# Patient Record
Sex: MALE | Race: OTHER | HISPANIC OR LATINO | URBAN - METROPOLITAN AREA
[De-identification: names, ages, dates, MRNs, and addresses within clinical notes are randomized per-mention and may not be internally consistent; named-entity substitution may affect disease eponyms.]

---

## 2017-07-25 ENCOUNTER — EMERGENCY (EMERGENCY)
Facility: HOSPITAL | Age: 37
LOS: 0 days | Discharge: HOME | End: 2017-07-25

## 2017-07-25 DIAGNOSIS — L23.7 ALLERGIC CONTACT DERMATITIS DUE TO PLANTS, EXCEPT FOOD: ICD-10-CM

## 2017-07-25 DIAGNOSIS — L29.9 PRURITUS, UNSPECIFIED: ICD-10-CM

## 2017-07-25 DIAGNOSIS — R21 RASH AND OTHER NONSPECIFIC SKIN ERUPTION: ICD-10-CM

## 2017-08-25 ENCOUNTER — EMERGENCY (EMERGENCY)
Facility: HOSPITAL | Age: 37
LOS: 0 days | Discharge: HOME | End: 2017-08-25

## 2017-08-25 DIAGNOSIS — H60.91 UNSPECIFIED OTITIS EXTERNA, RIGHT EAR: ICD-10-CM

## 2017-08-25 DIAGNOSIS — H92.01 OTALGIA, RIGHT EAR: ICD-10-CM

## 2017-09-01 ENCOUNTER — APPOINTMENT (OUTPATIENT)
Dept: OTOLARYNGOLOGY | Facility: CLINIC | Age: 37
End: 2017-09-01
Payer: COMMERCIAL

## 2017-09-01 VITALS — WEIGHT: 180 LBS | BODY MASS INDEX: 28.93 KG/M2 | HEIGHT: 66 IN

## 2017-09-01 DIAGNOSIS — Z78.9 OTHER SPECIFIED HEALTH STATUS: ICD-10-CM

## 2017-09-01 PROBLEM — Z00.00 ENCOUNTER FOR PREVENTIVE HEALTH EXAMINATION: Status: ACTIVE | Noted: 2017-09-01

## 2017-09-01 PROCEDURE — 99204 OFFICE O/P NEW MOD 45 MIN: CPT

## 2017-09-16 ENCOUNTER — OUTPATIENT (OUTPATIENT)
Dept: OUTPATIENT SERVICES | Facility: HOSPITAL | Age: 37
LOS: 1 days | Discharge: HOME | End: 2017-09-16

## 2017-09-16 DIAGNOSIS — H66.41 SUPPURATIVE OTITIS MEDIA, UNSPECIFIED, RIGHT EAR: ICD-10-CM

## 2017-10-16 ENCOUNTER — APPOINTMENT (OUTPATIENT)
Dept: OTOLARYNGOLOGY | Facility: CLINIC | Age: 37
End: 2017-10-16
Payer: COMMERCIAL

## 2017-10-16 VITALS — HEIGHT: 66 IN | WEIGHT: 181 LBS | BODY MASS INDEX: 29.09 KG/M2

## 2017-10-16 PROCEDURE — 99213 OFFICE O/P EST LOW 20 MIN: CPT

## 2017-11-06 ENCOUNTER — OUTPATIENT (OUTPATIENT)
Dept: OUTPATIENT SERVICES | Facility: HOSPITAL | Age: 37
LOS: 1 days | Discharge: HOME | End: 2017-11-06

## 2017-11-06 DIAGNOSIS — Z01.818 ENCOUNTER FOR OTHER PREPROCEDURAL EXAMINATION: ICD-10-CM

## 2017-11-06 DIAGNOSIS — H66.41 SUPPURATIVE OTITIS MEDIA, UNSPECIFIED, RIGHT EAR: ICD-10-CM

## 2017-11-14 ENCOUNTER — OUTPATIENT (OUTPATIENT)
Dept: OUTPATIENT SERVICES | Facility: HOSPITAL | Age: 37
LOS: 1 days | Discharge: HOME | End: 2017-11-14

## 2017-11-14 ENCOUNTER — APPOINTMENT (OUTPATIENT)
Dept: OTOLARYNGOLOGY | Facility: AMBULATORY SURGERY CENTER | Age: 37
End: 2017-11-14
Payer: COMMERCIAL

## 2017-11-14 ENCOUNTER — OTHER (OUTPATIENT)
Age: 37
End: 2017-11-14

## 2017-11-14 DIAGNOSIS — H90.11 CONDUCTIVE HEARING LOSS, UNILATERAL, RIGHT EAR, WITH UNRESTRICTED HEARING ON THE CONTRALATERAL SIDE: ICD-10-CM

## 2017-11-14 PROCEDURE — 69636 REBUILD EARDRUM STRUCTURES: CPT

## 2017-11-21 DIAGNOSIS — H71.91 UNSPECIFIED CHOLESTEATOMA, RIGHT EAR: ICD-10-CM

## 2017-11-21 DIAGNOSIS — H90.2 CONDUCTIVE HEARING LOSS, UNSPECIFIED: ICD-10-CM

## 2017-11-22 ENCOUNTER — APPOINTMENT (OUTPATIENT)
Dept: OTOLARYNGOLOGY | Facility: CLINIC | Age: 37
End: 2017-11-22
Payer: COMMERCIAL

## 2017-11-22 VITALS — BODY MASS INDEX: 29.73 KG/M2 | WEIGHT: 185 LBS | HEIGHT: 66 IN

## 2017-11-22 DIAGNOSIS — H71.91 UNSPECIFIED CHOLESTEATOMA, RIGHT EAR: ICD-10-CM

## 2017-11-22 PROCEDURE — 99024 POSTOP FOLLOW-UP VISIT: CPT

## 2017-12-01 ENCOUNTER — APPOINTMENT (OUTPATIENT)
Dept: OTOLARYNGOLOGY | Facility: CLINIC | Age: 37
End: 2017-12-01
Payer: COMMERCIAL

## 2017-12-01 VITALS — BODY MASS INDEX: 29.73 KG/M2 | WEIGHT: 185 LBS | HEIGHT: 66 IN

## 2017-12-01 PROCEDURE — 99024 POSTOP FOLLOW-UP VISIT: CPT

## 2017-12-21 ENCOUNTER — APPOINTMENT (OUTPATIENT)
Dept: OTOLARYNGOLOGY | Facility: CLINIC | Age: 37
End: 2017-12-21
Payer: COMMERCIAL

## 2017-12-21 VITALS — WEIGHT: 185 LBS | HEIGHT: 66 IN | BODY MASS INDEX: 29.73 KG/M2

## 2017-12-21 PROCEDURE — 99024 POSTOP FOLLOW-UP VISIT: CPT

## 2018-01-29 ENCOUNTER — APPOINTMENT (OUTPATIENT)
Dept: OTOLARYNGOLOGY | Facility: CLINIC | Age: 38
End: 2018-01-29
Payer: COMMERCIAL

## 2018-01-29 VITALS — WEIGHT: 184 LBS | HEIGHT: 66 IN | BODY MASS INDEX: 29.57 KG/M2

## 2018-01-29 DIAGNOSIS — H62.40 SUPERFICIAL MYCOSIS, UNSPECIFIED: ICD-10-CM

## 2018-01-29 DIAGNOSIS — B36.9 SUPERFICIAL MYCOSIS, UNSPECIFIED: ICD-10-CM

## 2018-01-29 PROCEDURE — 99024 POSTOP FOLLOW-UP VISIT: CPT

## 2018-01-29 PROCEDURE — G0268 REMOVAL OF IMPACTED WAX MD: CPT

## 2018-02-15 ENCOUNTER — APPOINTMENT (OUTPATIENT)
Dept: OTOLARYNGOLOGY | Facility: CLINIC | Age: 38
End: 2018-02-15

## 2018-04-12 ENCOUNTER — APPOINTMENT (OUTPATIENT)
Dept: OTOLARYNGOLOGY | Facility: CLINIC | Age: 38
End: 2018-04-12
Payer: SUBSIDIZED

## 2018-04-12 DIAGNOSIS — H66.41 SUPPURATIVE OTITIS MEDIA, UNSPECIFIED, RIGHT EAR: ICD-10-CM

## 2018-04-12 PROCEDURE — 99213 OFFICE O/P EST LOW 20 MIN: CPT | Mod: 25

## 2018-04-12 PROCEDURE — 92567 TYMPANOMETRY: CPT

## 2018-04-12 PROCEDURE — 92557 COMPREHENSIVE HEARING TEST: CPT

## 2018-11-20 ENCOUNTER — APPOINTMENT (OUTPATIENT)
Dept: OTOLARYNGOLOGY | Facility: CLINIC | Age: 38
End: 2018-11-20
Payer: SUBSIDIZED

## 2018-11-20 DIAGNOSIS — G89.18 OTHER ACUTE POSTPROCEDURAL PAIN: ICD-10-CM

## 2018-11-20 DIAGNOSIS — H90.11 CONDUCTIVE HEARING LOSS, UNILATERAL, RIGHT EAR, WITH UNRESTRICTED HEARING ON THE CONTRALATERAL SIDE: ICD-10-CM

## 2018-11-20 PROCEDURE — 99213 OFFICE O/P EST LOW 20 MIN: CPT | Mod: 25

## 2018-11-20 PROCEDURE — 92567 TYMPANOMETRY: CPT

## 2021-01-15 ENCOUNTER — OUTPATIENT (OUTPATIENT)
Dept: OUTPATIENT SERVICES | Facility: HOSPITAL | Age: 41
LOS: 1 days | Discharge: HOME | End: 2021-01-15
Payer: SELF-PAY

## 2021-01-15 ENCOUNTER — APPOINTMENT (OUTPATIENT)
Dept: OPHTHALMOLOGY | Facility: CLINIC | Age: 41
End: 2021-01-15

## 2021-01-15 DIAGNOSIS — H35.89 OTHER SPECIFIED RETINAL DISORDERS: ICD-10-CM

## 2021-01-15 DIAGNOSIS — H52.202 UNSPECIFIED ASTIGMATISM, LEFT EYE: ICD-10-CM

## 2021-01-15 PROCEDURE — 92004 COMPRE OPH EXAM NEW PT 1/>: CPT

## 2021-01-15 PROCEDURE — 92250 FUNDUS PHOTOGRAPHY W/I&R: CPT | Mod: 26

## 2021-05-22 ENCOUNTER — LABORATORY RESULT (OUTPATIENT)
Age: 41
End: 2021-05-22

## 2021-05-22 ENCOUNTER — OUTPATIENT (OUTPATIENT)
Dept: OUTPATIENT SERVICES | Facility: HOSPITAL | Age: 41
LOS: 1 days | Discharge: HOME | End: 2021-05-22

## 2021-05-22 DIAGNOSIS — Z11.59 ENCOUNTER FOR SCREENING FOR OTHER VIRAL DISEASES: ICD-10-CM

## 2022-04-22 ENCOUNTER — OUTPATIENT (OUTPATIENT)
Dept: OUTPATIENT SERVICES | Facility: HOSPITAL | Age: 42
LOS: 1 days | Discharge: HOME | End: 2022-04-22
Payer: MEDICAID

## 2022-04-22 DIAGNOSIS — R13.10 DYSPHAGIA, UNSPECIFIED: ICD-10-CM

## 2022-04-22 PROCEDURE — 74220 X-RAY XM ESOPHAGUS 1CNTRST: CPT | Mod: 26

## 2022-05-24 ENCOUNTER — OUTPATIENT (OUTPATIENT)
Dept: OUTPATIENT SERVICES | Facility: HOSPITAL | Age: 42
LOS: 1 days | Discharge: HOME | End: 2022-05-24

## 2023-07-06 ENCOUNTER — EMERGENCY (EMERGENCY)
Facility: HOSPITAL | Age: 43
LOS: 0 days | Discharge: HOME | End: 2023-07-07
Attending: EMERGENCY MEDICINE
Payer: MEDICAID

## 2023-07-06 VITALS
RESPIRATION RATE: 18 BRPM | OXYGEN SATURATION: 98 % | SYSTOLIC BLOOD PRESSURE: 124 MMHG | TEMPERATURE: 99 F | DIASTOLIC BLOOD PRESSURE: 69 MMHG | HEART RATE: 76 BPM | WEIGHT: 190.04 LBS | HEIGHT: 68 IN

## 2023-07-06 DIAGNOSIS — R61 GENERALIZED HYPERHIDROSIS: ICD-10-CM

## 2023-07-06 DIAGNOSIS — E78.5 HYPERLIPIDEMIA, UNSPECIFIED: ICD-10-CM

## 2023-07-06 DIAGNOSIS — R06.02 SHORTNESS OF BREATH: ICD-10-CM

## 2023-07-06 DIAGNOSIS — R07.89 OTHER CHEST PAIN: ICD-10-CM

## 2023-07-06 DIAGNOSIS — Z79.82 LONG TERM (CURRENT) USE OF ASPIRIN: ICD-10-CM

## 2023-07-06 DIAGNOSIS — M25.512 PAIN IN LEFT SHOULDER: ICD-10-CM

## 2023-07-06 DIAGNOSIS — R94.31 ABNORMAL ELECTROCARDIOGRAM [ECG] [EKG]: ICD-10-CM

## 2023-07-06 DIAGNOSIS — E78.00 PURE HYPERCHOLESTEROLEMIA, UNSPECIFIED: ICD-10-CM

## 2023-07-06 LAB
ALBUMIN SERPL ELPH-MCNC: 4.8 G/DL — SIGNIFICANT CHANGE UP (ref 3.5–5.2)
ALP SERPL-CCNC: 99 U/L — SIGNIFICANT CHANGE UP (ref 30–115)
ALT FLD-CCNC: 25 U/L — SIGNIFICANT CHANGE UP (ref 0–41)
ANION GAP SERPL CALC-SCNC: 10 MMOL/L — SIGNIFICANT CHANGE UP (ref 7–14)
AST SERPL-CCNC: 20 U/L — SIGNIFICANT CHANGE UP (ref 0–41)
BASOPHILS # BLD AUTO: 0.04 K/UL — SIGNIFICANT CHANGE UP (ref 0–0.2)
BASOPHILS NFR BLD AUTO: 0.6 % — SIGNIFICANT CHANGE UP (ref 0–1)
BILIRUB SERPL-MCNC: 0.3 MG/DL — SIGNIFICANT CHANGE UP (ref 0.2–1.2)
BUN SERPL-MCNC: 18 MG/DL — SIGNIFICANT CHANGE UP (ref 10–20)
CALCIUM SERPL-MCNC: 9 MG/DL — SIGNIFICANT CHANGE UP (ref 8.4–10.5)
CHLORIDE SERPL-SCNC: 103 MMOL/L — SIGNIFICANT CHANGE UP (ref 98–110)
CO2 SERPL-SCNC: 24 MMOL/L — SIGNIFICANT CHANGE UP (ref 17–32)
CREAT SERPL-MCNC: 0.8 MG/DL — SIGNIFICANT CHANGE UP (ref 0.7–1.5)
EGFR: 113 ML/MIN/1.73M2 — SIGNIFICANT CHANGE UP
EOSINOPHIL # BLD AUTO: 0.2 K/UL — SIGNIFICANT CHANGE UP (ref 0–0.7)
EOSINOPHIL NFR BLD AUTO: 2.8 % — SIGNIFICANT CHANGE UP (ref 0–8)
GLUCOSE SERPL-MCNC: 103 MG/DL — HIGH (ref 70–99)
HCT VFR BLD CALC: 42.5 % — SIGNIFICANT CHANGE UP (ref 42–52)
HGB BLD-MCNC: 15.2 G/DL — SIGNIFICANT CHANGE UP (ref 14–18)
IMM GRANULOCYTES NFR BLD AUTO: 0.3 % — SIGNIFICANT CHANGE UP (ref 0.1–0.3)
LYMPHOCYTES # BLD AUTO: 1.61 K/UL — SIGNIFICANT CHANGE UP (ref 1.2–3.4)
LYMPHOCYTES # BLD AUTO: 22.8 % — SIGNIFICANT CHANGE UP (ref 20.5–51.1)
MAGNESIUM SERPL-MCNC: 2.2 MG/DL — SIGNIFICANT CHANGE UP (ref 1.8–2.4)
MCHC RBC-ENTMCNC: 31.1 PG — HIGH (ref 27–31)
MCHC RBC-ENTMCNC: 35.8 G/DL — SIGNIFICANT CHANGE UP (ref 32–37)
MCV RBC AUTO: 86.9 FL — SIGNIFICANT CHANGE UP (ref 80–94)
MONOCYTES # BLD AUTO: 0.44 K/UL — SIGNIFICANT CHANGE UP (ref 0.1–0.6)
MONOCYTES NFR BLD AUTO: 6.2 % — SIGNIFICANT CHANGE UP (ref 1.7–9.3)
NEUTROPHILS # BLD AUTO: 4.76 K/UL — SIGNIFICANT CHANGE UP (ref 1.4–6.5)
NEUTROPHILS NFR BLD AUTO: 67.3 % — SIGNIFICANT CHANGE UP (ref 42.2–75.2)
NRBC # BLD: 0 /100 WBCS — SIGNIFICANT CHANGE UP (ref 0–0)
PLATELET # BLD AUTO: 263 K/UL — SIGNIFICANT CHANGE UP (ref 130–400)
PMV BLD: 11.2 FL — HIGH (ref 7.4–10.4)
POTASSIUM SERPL-MCNC: 3.8 MMOL/L — SIGNIFICANT CHANGE UP (ref 3.5–5)
POTASSIUM SERPL-SCNC: 3.8 MMOL/L — SIGNIFICANT CHANGE UP (ref 3.5–5)
PROT SERPL-MCNC: 6.9 G/DL — SIGNIFICANT CHANGE UP (ref 6–8)
RBC # BLD: 4.89 M/UL — SIGNIFICANT CHANGE UP (ref 4.7–6.1)
RBC # FLD: 12.4 % — SIGNIFICANT CHANGE UP (ref 11.5–14.5)
SODIUM SERPL-SCNC: 137 MMOL/L — SIGNIFICANT CHANGE UP (ref 135–146)
TROPONIN T SERPL-MCNC: <0.01 NG/ML — SIGNIFICANT CHANGE UP
WBC # BLD: 7.07 K/UL — SIGNIFICANT CHANGE UP (ref 4.8–10.8)
WBC # FLD AUTO: 7.07 K/UL — SIGNIFICANT CHANGE UP (ref 4.8–10.8)

## 2023-07-06 PROCEDURE — 83735 ASSAY OF MAGNESIUM: CPT

## 2023-07-06 PROCEDURE — 85025 COMPLETE CBC W/AUTO DIFF WBC: CPT

## 2023-07-06 PROCEDURE — 71046 X-RAY EXAM CHEST 2 VIEWS: CPT | Mod: 26

## 2023-07-06 PROCEDURE — 80053 COMPREHEN METABOLIC PANEL: CPT

## 2023-07-06 PROCEDURE — 36415 COLL VENOUS BLD VENIPUNCTURE: CPT

## 2023-07-06 PROCEDURE — 99285 EMERGENCY DEPT VISIT HI MDM: CPT | Mod: 25

## 2023-07-06 PROCEDURE — 99223 1ST HOSP IP/OBS HIGH 75: CPT

## 2023-07-06 PROCEDURE — 75574 CT ANGIO HRT W/3D IMAGE: CPT | Mod: MA

## 2023-07-06 PROCEDURE — 93005 ELECTROCARDIOGRAM TRACING: CPT

## 2023-07-06 PROCEDURE — 84484 ASSAY OF TROPONIN QUANT: CPT

## 2023-07-06 PROCEDURE — G0378: CPT

## 2023-07-06 PROCEDURE — 71046 X-RAY EXAM CHEST 2 VIEWS: CPT

## 2023-07-06 PROCEDURE — 93010 ELECTROCARDIOGRAM REPORT: CPT

## 2023-07-06 RX ORDER — ASPIRIN/CALCIUM CARB/MAGNESIUM 324 MG
324 TABLET ORAL ONCE
Refills: 0 | Status: COMPLETED | OUTPATIENT
Start: 2023-07-06 | End: 2023-07-06

## 2023-07-06 RX ORDER — METOPROLOL TARTRATE 50 MG
50 TABLET ORAL ONCE
Refills: 0 | Status: COMPLETED | OUTPATIENT
Start: 2023-07-06 | End: 2023-07-06

## 2023-07-06 RX ADMIN — Medication 324 MILLIGRAM(S): at 22:17

## 2023-07-06 NOTE — ED ADULT NURSE NOTE - OBJECTIVE STATEMENT
PT reports chest tightness and sob x 2 weeks on and off denies any medical hx denies dizziness, reports he feels ok now but wants to be evaluated for his heart. EKG obtained in triage.

## 2023-07-06 NOTE — ED CDU PROVIDER INITIAL DAY NOTE - PROGRESS NOTE DETAILS
received signout from Dr. Herrmann - pt in obs for evaluation of chest pain; initial ce/ekg unremarkable; will repeat and plan for ccta in am;

## 2023-07-06 NOTE — ED PROVIDER NOTE - CLINICAL SUMMARY MEDICAL DECISION MAKING FREE TEXT BOX
42-year-old male with history of hyperlipidemia presenting today with left-sided chest pressure rating to the left shoulder associate with nausea and diaphoresis intermittent for the last 2 weeks.  States the pain is usually present at night, denies any exertional symptoms.  exam unremarkable- no reproducible chest pain, no bruits to carotids, pulses intact to bilateral radial pulses, no rashes to skin. lungs cta. Denies cardiac work-up in the past.  Denies family history of ACS.  EKG with T wave inversions in the inferior leads, labs otherwise unremarkable including troponin negative.  Chest x-ray with no focal opacities.  Patient given aspirin, placed in ED OU for ACS work-up.

## 2023-07-06 NOTE — ED ADULT NURSE NOTE - NSFALLUNIVINTERV_ED_ALL_ED
Bed/Stretcher in lowest position, wheels locked, appropriate side rails in place/Call bell, personal items and telephone in reach/Instruct patient to call for assistance before getting out of bed/chair/stretcher/Non-slip footwear applied when patient is off stretcher/[place] to call system/Physically safe environment - no spills, clutter or unnecessary equipment/Purposeful proactive rounding/Room/bathroom lighting operational, light cord in reach

## 2023-07-06 NOTE — ED PROVIDER NOTE - PHYSICAL EXAMINATION
_  Vital signs reviewed; ABCs intact  GENERAL: Well nourished, comfortable  SKIN: Warm, dry  HEAD & NECK: NCAT, supple neck  EYES: EOMI, PER B/L  ENT: MMM  CARD: RRR, S1, S2; no murmurs, no rubs, no gallops  RESP: Normal respiratory effort, CTAB, no rales, no wheezing  CHEST: No chest wall tenderness/deformity/tenting/crepitus  ABD: Soft, ND, NT, no rebound, no guarding  NEUROMSK: Grossly intact  PSYCH: AAOx3, cooperative, appropriate

## 2023-07-06 NOTE — ED PROVIDER NOTE - OBJECTIVE STATEMENT
Patient is a 42-year-old man with a history of mildly elevated cholesterol levels, never smoker, presenting with chest pain for 2 weeks.  Pain is on the left side of the chest radiating to the left shoulder, occurring intermittently, with association of shortness of breath.  Pain is described as tightness/pressure. The pain and dyspnea are nonexertional.  No immobilization or recent surgery, personal or family h/o VTE, hemoptysis, malignancy, or hormone use., reproducible with palpation; no family history of significant cardiac disease or sudden unexplained deaths.

## 2023-07-06 NOTE — ED CDU PROVIDER INITIAL DAY NOTE - NSICDXNOFAMILYHX_GEN_ALL_ED
Product 32 Units: 0 Name Of Product 15: Growth Factor Serum Name Of Product 21: Oil Control Pads Name Of Product 18: ZO SPF Primer Name Of Product 30: Growth Factor Serum Eye Name Of Product 9: Pigment HQ4% blending RX Product 5 Price (In Dollars - Numeric Only, No Special Characters Or $): 0.00 Name Of Product 12: Exfoliating polish travel size Detail Level: Zone Product 32 Application Directions: Apply to entire face QOS or alternating with Retinol Name Of Product 6: Daily Power Defense Product 1 Application Directions: Wash BID Name Of Product 33: Complexion clearing mask Product 24 Application Directions: Apply to entire face BID after cleansing Product 18 Application Directions: Apply to entire face QAM Name Of Product 2: Exfoliating polish Product 21 Application Directions: Swab BID after cleansing Product 9 Application Directions: Apply HS Product 15 Application Directions: Apply to face BID Assigning Risk Information: Per AMA, level of risk is based upon consequences of the problem(s) addressed at the encounter when appropriately treated. Risk also includes medical decision making related to the need to initiate or forego further testing, treatment and/or hospitalization. Over the counter medication are assigned a risk level of low. Prescription medication management is assigned a risk level of moderate. Risk Of Complication Category: No MDM Name Of Product 22: Intense Eye Repair Cream currye Product 6 Application Directions: Apply to entire face QHS Product 35 Application Directions: Apply to entire face QHS PRN <-- Click to add NO pertinent Family History Name Of Product 28: Hydrating Cream Name Of Product 19: Tretinoin 0.05% (20g) Name Of Product 25: Hydrafirm / Brightening Eye Cream Name Of Product 13: Radical night repair Name Of Product 16: Wrinkle Texture Repair Name Of Product 7: Instant Pore refiner Product 33 Application Directions: 1-2 x week Name Of Product 10: Complexion Renewal Kit Product 30 Application Directions: Apply QHS alternating with Retinol Brightener Allow Plan To Count Towards E/M Coding: No (this will remove associated impression from E/M calculation) Name Of Product 31: Wrinkle and Texture Repair Product 2 Application Directions: 3 to 5 x week after cleansing Name Of Product 36: Enzymatic peel Name Of Product 34: Growth Factor Eye Serum Name Of Product 3: Rozatrol odell Product 3 Application Directions: Apply to entire face BID after Daily Power Defense Product 22 Application Directions: Apply to eyelids and crows feet BID Product 25 Application Directions: Apply to periorbital region QHS Product 16 Application Directions: Apply to entire face QOS as tolerated Product 28 Application Directions: Apply to entire face QOS PRN alternating with Retin A Product 19 Application Directions: Apply QHS as tolerated Send Charges To Patient Encounter: No Product 13 Application Directions: Apply to entire face QHS after cleansing Product 7 Application Directions: Apply to entire face BID Product 10 Application Directions: Apply to entire face BID as directed Name Of Product 20: Retinol Brightener Name Of Product 26: Brightalive travel size freebie Name Of Product 4: Pigment control HQ4% RX Name Of Product 23: Gentle cleanser travel size Name Of Product 17: Complexion renewal pads Name Of Product 29: ZO Broad SPF 50 Name Of Product 11: Firming Serum Name Of Product 14: Dual Action Scrub Product 36 Application Directions: QHS as tolerated Name Of Product 8: Exfoliating accelerator Product 34 Application Directions: Apply to eyes BID Product 26 Units: 1 Product 26 Application Directions: Apply to entire face BID after pads Name Of Product 35: Tretinoin 0.1% Name Of Product 1: Gentle cleanser Product 23 Application Directions: Cleanse face BID Product 4 Application Directions: Apply to entire face for pigmentation QAM Product 14 Application Directions: Scrub Every other day after cleansing Product 29 Application Directions: Apply to entire face QAM and reapply as needed. Name Of Product 5: Vitamin C serum Product 5 Application Directions: Apply QAM Product 11 Application Directions: Apply BID Name Of Product 24: Calming toner Name Of Product 27: SPF Brush 40 medium

## 2023-07-07 VITALS
HEART RATE: 63 BPM | OXYGEN SATURATION: 99 % | TEMPERATURE: 98 F | DIASTOLIC BLOOD PRESSURE: 65 MMHG | RESPIRATION RATE: 18 BRPM | SYSTOLIC BLOOD PRESSURE: 112 MMHG

## 2023-07-07 LAB — TROPONIN T SERPL-MCNC: <0.01 NG/ML — SIGNIFICANT CHANGE UP

## 2023-07-07 PROCEDURE — 93010 ELECTROCARDIOGRAM REPORT: CPT | Mod: 76

## 2023-07-07 PROCEDURE — 75574 CT ANGIO HRT W/3D IMAGE: CPT | Mod: 26,MA

## 2023-07-07 PROCEDURE — 99238 HOSP IP/OBS DSCHRG MGMT 30/<: CPT

## 2023-07-07 RX ORDER — METOPROLOL TARTRATE 50 MG
50 TABLET ORAL ONCE
Refills: 0 | Status: DISCONTINUED | OUTPATIENT
Start: 2023-07-07 | End: 2023-07-07

## 2023-07-07 RX ADMIN — Medication 50 MILLIGRAM(S): at 00:41

## 2023-07-07 NOTE — ED CDU PROVIDER DISPOSITION NOTE - PATIENT PORTAL LINK FT
You can access the FollowMyHealth Patient Portal offered by [facility] by registering at the following website: [email or web address]. By joining [employer]’s FollowMyHealth portal, you will also be able to view your health information using other applications (apps) compatible with our system.

## 2023-07-07 NOTE — ED CDU PROVIDER SUBSEQUENT DAY NOTE - PROGRESS NOTE DETAILS
Patient has CCTA score of 2; no immediate intervention required.  We will have patient follow-up with cardiologist outpatient.  The patient has been made aware of all incidental findings.  Patient is stable for discharge.

## 2023-07-07 NOTE — ED CDU PROVIDER DISPOSITION NOTE - CARE PROVIDER_API CALL
Matt Valle  Interventional Cardiology  [address] [place] [address], [address]  [place] [address]  Phone: [phone]  Fax: [phone]  Follow Up Time: 1-3 Days

## 2023-07-07 NOTE — ED CDU PROVIDER DISPOSITION NOTE - CLINICAL COURSE
pt presented to ed for eval of chest pain. pt placed in edou under chest pain protocol. pt with workup including labs wnl, including 2 sets of negative cardiac enzymes, 2 ekg with no ischemic changes, normal cxray, CCTA with cad rad 2, no events on cardiac monitor. no chest pain at time of dc. pt advised to f/u with cardiology. Return for any concerning signs or symptoms. will prescibe asa, statin therapy

## 2023-07-10 RX ORDER — ATORVASTATIN CALCIUM 80 MG/1
1 TABLET, FILM COATED ORAL
Qty: 30 | Refills: 0
Start: 2023-07-10 | End: 2023-08-08

## 2023-07-10 RX ORDER — ASPIRIN/CALCIUM CARB/MAGNESIUM 324 MG
1 TABLET ORAL
Qty: 30 | Refills: 0
Start: 2023-07-10 | End: 2023-08-08

## 2023-07-12 ENCOUNTER — APPOINTMENT (OUTPATIENT)
Dept: CARDIOLOGY | Facility: CLINIC | Age: 43
End: 2023-07-12
Payer: MEDICAID

## 2023-07-12 VITALS
HEIGHT: 66 IN | HEART RATE: 69 BPM | OXYGEN SATURATION: 97 % | WEIGHT: 181 LBS | TEMPERATURE: 97.8 F | BODY MASS INDEX: 29.09 KG/M2 | RESPIRATION RATE: 16 BRPM | SYSTOLIC BLOOD PRESSURE: 114 MMHG | DIASTOLIC BLOOD PRESSURE: 75 MMHG

## 2023-07-12 DIAGNOSIS — Q24.5 MALFORMATION OF CORONARY VESSELS: ICD-10-CM

## 2023-07-12 DIAGNOSIS — R07.9 CHEST PAIN, UNSPECIFIED: ICD-10-CM

## 2023-07-12 PROBLEM — Z78.9 OTHER SPECIFIED HEALTH STATUS: Chronic | Status: ACTIVE | Noted: 2023-07-06

## 2023-07-12 PROCEDURE — 93000 ELECTROCARDIOGRAM COMPLETE: CPT

## 2023-07-12 PROCEDURE — 99204 OFFICE O/P NEW MOD 45 MIN: CPT | Mod: 25

## 2023-07-12 RX ORDER — OXYCODONE AND ACETAMINOPHEN 5; 325 MG/1; MG/1
5-325 TABLET ORAL
Qty: 10 | Refills: 0 | Status: DISCONTINUED | COMMUNITY
Start: 2017-11-14 | End: 2023-07-12

## 2023-07-12 RX ORDER — AMOXICILLIN AND CLAVULANATE POTASSIUM 875; 125 MG/1; MG/1
875-125 TABLET, COATED ORAL
Qty: 20 | Refills: 0 | Status: DISCONTINUED | COMMUNITY
Start: 2017-12-21 | End: 2023-07-12

## 2023-07-12 RX ORDER — CIPROFLOXACIN AND DEXAMETHASONE 3; 1 MG/ML; MG/ML
0.3-0.1 SUSPENSION/ DROPS AURICULAR (OTIC) TWICE DAILY
Qty: 1 | Refills: 2 | Status: DISCONTINUED | COMMUNITY
Start: 2017-09-01 | End: 2023-07-12

## 2023-07-12 RX ORDER — METOPROLOL SUCCINATE 25 MG/1
25 TABLET, EXTENDED RELEASE ORAL DAILY
Qty: 30 | Refills: 3 | Status: ACTIVE | COMMUNITY
Start: 2023-07-12 | End: 1900-01-01

## 2023-07-12 RX ORDER — CLOTRIMAZOLE 1 %
1 SPRAY, NON-AEROSOL (ML) TOPICAL
Qty: 1 | Refills: 1 | Status: DISCONTINUED | COMMUNITY
Start: 2018-01-29 | End: 2023-07-12

## 2023-07-12 NOTE — ASSESSMENT
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 \par \par 1. Myocardial bridging: I do not believe this is the primary cause of his chest pain. However, due to this finding, he should be on aspirin, low dose statin and metoprolol. I explained the pathophysiology of myocardial bridging to the patient and his wife in detail. I discussed the signs and symptoms of a heart attack and when to go to the ER. They verbalized understanding. \par \par 2. Chest pain: Check echocardiogram to rule out structural abnormalities. I do believe his chest pain is more psychological as it is associated with racing thoughts. Have advised PCP and mental health referral. \par \par The primary prevention of heart disease was discussed in detail with the patient, including adhering to a heart healthy, plant based, or Mediterranean diet, and the importance of 30 minutes of moderate intensity activity for 30 minutes, 5 times a week. All the patient's questions were answered.\par \par RTC in six months.

## 2023-07-12 NOTE — HISTORY OF PRESENT ILLNESS
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 \par \par He presented on 7/6 for chest pain. He had a CCTA with results as below. He is here for follow up today. \par \par Today, he says he feels a little better. He still gets chest pain and shortness of breath though. This is not with movement or on exertion. His symptoms happen when he is sitting still and thinking and his mind is racing. He denies palpitations and syncope. No leg swelling, orthopnea and PND.\par

## 2023-07-12 NOTE — REVIEW OF SYSTEMS
[SOB] : shortness of breath [Chest Discomfort] : chest discomfort [Anxiety] : anxiety [Under Stress] : under stress [Negative] : Heme/Lymph

## 2023-07-12 NOTE — CARDIOLOGY SUMMARY
[de-identified] : 7/12/23: normal sinus rhythm [de-identified] : CCTA 7/7/23 ---Short segment of mild narrowing within the LAD related to myocardial bridging.\par The total Agatston coronary artery calcium score equals 0.

## 2023-07-14 ENCOUNTER — APPOINTMENT (OUTPATIENT)
Dept: CARDIOLOGY | Facility: CLINIC | Age: 43
End: 2023-07-14
Payer: MEDICAID

## 2023-07-14 PROCEDURE — 93306 TTE W/DOPPLER COMPLETE: CPT

## 2023-07-15 ENCOUNTER — EMERGENCY (EMERGENCY)
Facility: HOSPITAL | Age: 43
LOS: 0 days | Discharge: HOME | End: 2023-07-15
Attending: EMERGENCY MEDICINE
Payer: MEDICAID

## 2023-07-15 VITALS
SYSTOLIC BLOOD PRESSURE: 133 MMHG | WEIGHT: 182.1 LBS | TEMPERATURE: 97 F | RESPIRATION RATE: 18 BRPM | OXYGEN SATURATION: 99 % | HEART RATE: 85 BPM | HEIGHT: 68 IN | DIASTOLIC BLOOD PRESSURE: 77 MMHG

## 2023-07-15 DIAGNOSIS — M25.571 PAIN IN RIGHT ANKLE AND JOINTS OF RIGHT FOOT: ICD-10-CM
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
## 2023-07-15 DIAGNOSIS — Z79.82 LONG TERM (CURRENT) USE OF ASPIRIN: ICD-10-CM

## 2023-07-15 DIAGNOSIS — S93.401A SPRAIN OF UNSPECIFIED LIGAMENT OF RIGHT ANKLE, INITIAL ENCOUNTER: ICD-10-CM

## 2023-07-15 PROCEDURE — 73590 X-RAY EXAM OF LOWER LEG: CPT | Mod: RT

## 2023-07-15 PROCEDURE — 29515 APPLICATION SHORT LEG SPLINT: CPT | Mod: RT

## 2023-07-15 PROCEDURE — 93970 EXTREMITY STUDY: CPT

## 2023-07-15 PROCEDURE — 73610 X-RAY EXAM OF ANKLE: CPT | Mod: 26,RT

## 2023-07-15 PROCEDURE — 99284 EMERGENCY DEPT VISIT MOD MDM: CPT | Mod: 25

## 2023-07-15 PROCEDURE — 73590 X-RAY EXAM OF LOWER LEG: CPT | Mod: 26,RT

## 2023-07-15 PROCEDURE — 93970 EXTREMITY STUDY: CPT | Mod: 26

## 2023-07-15 PROCEDURE — 29515 APPLICATION SHORT LEG SPLINT: CPT

## 2023-07-15 PROCEDURE — 73610 X-RAY EXAM OF ANKLE: CPT | Mod: RT

## 2023-07-15 NOTE — ED PROVIDER NOTE - PROGRESS NOTE DETAILS
Patient signed out to Dr. Cruz pending x-ray and disposition Duplex negative. Placed posterior splint and advised close follow up with ortho. Patient signed out to Dr. Cruz pending x-ray and disposition.

## 2023-07-15 NOTE — ED PROVIDER NOTE - NSFOLLOWUPCLINICS_GEN_ALL_ED_FT
[facility]  Orthpedic  [address], [place]   Phone: [phone]  Fax:

## 2023-07-15 NOTE — ED PROVIDER NOTE - DIFFERENTIAL DIAGNOSIS
Traumatic injury to R ankle region, (+) pain and swelling. R/o acute traumatic injury, also r/o DVT given extent of swelling. Differential Diagnosis

## 2023-07-15 NOTE — ED PROVIDER NOTE - NSFOLLOWUPINSTRUCTIONS_ED_ALL_ED_FT
Please elevate your leg and keep weight off for 1 week. Keep splint in place for 1 week or until you follow up with the orthopedic Doctor.     Our Emergency Department Referral Coordinators will be reaching out to you in the next 24-48 hours from 9:00am to 5:00pm with a follow up appointment. Please expect a phone call from the hospital in that time frame. If you do not receive a call or if you have any questions or concerns, you can reach them at [phone] or [phone].     Ankle Sprain    WHAT YOU NEED TO KNOW:    An ankle sprain happens when 1 or more ligaments in your ankle joint stretch or tear. Ligaments are tough tissues that connect bones. Ligaments support your joints and keep your bones in place.    DISCHARGE INSTRUCTIONS:    Return to the emergency department if:     You have severe pain in your ankle.      Your foot or toes are cold or numb.      Your ankle becomes more weak or unstable (wobbly).      You are unable to put any weight on your ankle or foot.      Your swelling has increased or returned.    Contact your healthcare provider if:     Your pain does not go away, even after treatment.      You have questions or concerns about your condition or care.    Medicines: You may need any of the following:     NSAIDs, such as ibuprofen, help decrease swelling, pain, and fever. This medicine is available with or without a doctor's order. NSAIDs can cause stomach bleeding or kidney problems in certain people. If you take blood thinner medicine, always ask your healthcare provider if NSAIDs are safe for you. Always read the medicine label and follow directions.      Acetaminophen decreases pain. It is available without a doctor's order. Ask how much to take and how often to take it. Follow directions. Acetaminophen can cause liver damage if not taken correctly.      Prescription pain medicine may be given. Ask how to take this medicine safely.      Take your medicine as directed. Contact your healthcare provider if you think your medicine is not helping or if you have side effects. Tell him or her if you are allergic to any medicine. Keep a list of the medicines, vitamins, and herbs you take. Include the amounts, and when and why you take them. Bring the list or the pill bottles to follow-up visits. Carry your medicine list with you in case of an emergency.    Self care:     Use support devices, such as a brace, cast, or splint, may be needed to limit your movement and protect your joint. You may need to use crutches to decrease your pain as you move around.       Go to physical therapy as directed. A physical therapist teaches you exercises to help improve movement and strength, and to decrease pain.      Rest your ankle so that it can heal. Return to normal activities as directed.      Apply ice on your ankle for 15 to 20 minutes every hour or as directed. Use an ice pack, or put crushed ice in a plastic bag. Cover it with a towel. Ice helps prevent tissue damage and decreases swelling and pain.      Compress your ankle. Ask if you should wrap an elastic bandage around your injured ligament. An elastic bandage provides support and helps decrease swelling and movement so your joint can heal. Wear as long as directed.      Elevate your ankle above the level of your heart as often as you can. This will help decrease swelling and pain. Prop your ankle on pillows or blankets to keep it elevated comfortably. Elevate Limb         Prevent another ankle sprain:     Let your ankle heal. Find out how long your ligament needs to heal. Do not do any physical activity until your healthcare provider says it is okay. If you start activity too soon, you may develop a more serious injury.      Always warm up and stretch before you exercise or play sports.      Use the right equipment. Always wear shoes that fit well and are made for the activity that you are doing. You may also need ankle supports, elbow and knee pads, or braces.    Follow up with your healthcare provider as directed: Write down your questions so you remember to ask them during your visits.        © Copyright [employer] 2019 All illustrations and images included in CareNotes are the copyrighted property of A.D.A.M., Inc. or [employer].

## 2023-07-15 NOTE — ED PROVIDER NOTE - CLINICAL SUMMARY MEDICAL DECISION MAKING FREE TEXT BOX
Patient presented with worsening right ankle and foot pain status post fall several days ago as documented.  Otherwise on arrival patient afebrile, hemodynamically stable, neurovascularly intact, but noted significant swelling to the right lower extremity particularly in the ankle and calf region.  No overlying cellulitis, fluctuance or crepitus to suggest underlying infection, and compartments soft. X-rays obtained and negative for acute traumatic injury, and DVT study obtained and negative.  Given persistent pain, placed splint and will discharge home with outpatient Ortho follow-up.  Patient agreeable with plan. Patient presented with worsening right ankle and foot pain status post fall several days ago as documented.  Otherwise on arrival patient afebrile, hemodynamically stable, neurovascularly intact, but noted significant swelling to the right lower extremity particularly in the ankle and calf region.  No overlying cellulitis, fluctuance or crepitus to suggest underlying infection, and compartments soft. X-rays obtained and negative for acute traumatic injury, and DVT study obtained and negative.  Given persistent pain, placed splint and will discharge home with outpatient Ortho follow-up.  Patient agreeable with plan. Agrees to return to ED for any new or worsening symptoms.

## 2023-07-15 NOTE — ED PROVIDER NOTE - OBJECTIVE STATEMENT
42-year-old male presenting today after a bench had fallen onto his right foot/leg a few days ago.  Patient reports having swelling and pain to the area.  Denies any numbness/tingling/weakness.

## 2023-07-15 NOTE — ED PROVIDER NOTE - PATIENT PORTAL LINK FT
You can access the FollowMyHealth Patient Portal offered by [facility] by registering at the following website: [email or web address]. By joining [employer]’s FollowMyHealth portal, you will also be able to view your health information using other applications (apps) compatible with our system.

## 2023-07-15 NOTE — ED PROVIDER NOTE - NSFOLLOWUPCLINICSTOKEN_GEN_ALL_ED_FT
Alert-The patient is alert, awake and responds to voice. The patient is oriented to time, place, and person. The triage nurse is able to obtain subjective information.
[id number]: || ||00\01||False;

## 2023-07-15 NOTE — ED PROVIDER NOTE - CARE PROVIDER_API CALL
Ravin Sanchez  Orthopaedic Surgery  [address] [place] [address]  Phone: [phone]  Fax: [phone]  Follow Up Time:

## 2023-09-05 NOTE — ED PROVIDER NOTE - MDM PATIENT STATEMENT FOR ADDL TREATMENT
Will discuss at her visit
Patient with one or more new problems requiring additional work-up/treatment.

## 2024-01-17 ENCOUNTER — APPOINTMENT (OUTPATIENT)
Dept: CARDIOLOGY | Facility: CLINIC | Age: 44
End: 2024-01-17

## 2024-07-30 ENCOUNTER — APPOINTMENT (OUTPATIENT)
Dept: OPHTHALMOLOGY | Facility: CLINIC | Age: 44
End: 2024-07-30

## 2024-12-20 ENCOUNTER — EMERGENCY (EMERGENCY)
Facility: HOSPITAL | Age: 44
LOS: 0 days | Discharge: HOME | End: 2024-12-20
Attending: STUDENT IN AN ORGANIZED HEALTH CARE EDUCATION/TRAINING PROGRAM
Payer: MEDICAID

## 2024-12-20 VITALS
SYSTOLIC BLOOD PRESSURE: 118 MMHG | TEMPERATURE: 98 F | OXYGEN SATURATION: 99 % | RESPIRATION RATE: 18 BRPM | HEART RATE: 78 BPM | DIASTOLIC BLOOD PRESSURE: 75 MMHG

## 2024-12-20 VITALS
OXYGEN SATURATION: 96 % | SYSTOLIC BLOOD PRESSURE: 150 MMHG | TEMPERATURE: 98 F | WEIGHT: 195.11 LBS | HEART RATE: 70 BPM | DIASTOLIC BLOOD PRESSURE: 80 MMHG | RESPIRATION RATE: 18 BRPM

## 2024-12-20 DIAGNOSIS — Z79.84 LONG TERM (CURRENT) USE OF ORAL HYPOGLYCEMIC DRUGS: ICD-10-CM

## 2024-12-20 DIAGNOSIS — R51.9 HEADACHE, UNSPECIFIED: ICD-10-CM

## 2024-12-20 DIAGNOSIS — R19.7 DIARRHEA, UNSPECIFIED: ICD-10-CM

## 2024-12-20 DIAGNOSIS — R42 DIZZINESS AND GIDDINESS: ICD-10-CM

## 2024-12-20 DIAGNOSIS — E11.9 TYPE 2 DIABETES MELLITUS WITHOUT COMPLICATIONS: ICD-10-CM

## 2024-12-20 DIAGNOSIS — K52.9 NONINFECTIVE GASTROENTERITIS AND COLITIS, UNSPECIFIED: ICD-10-CM

## 2024-12-20 DIAGNOSIS — R11.2 NAUSEA WITH VOMITING, UNSPECIFIED: ICD-10-CM

## 2024-12-20 DIAGNOSIS — H53.8 OTHER VISUAL DISTURBANCES: ICD-10-CM

## 2024-12-20 LAB
ALBUMIN SERPL ELPH-MCNC: 4.7 G/DL — SIGNIFICANT CHANGE UP (ref 3.5–5.2)
ALP SERPL-CCNC: 204 U/L — HIGH (ref 30–115)
ALT FLD-CCNC: 25 U/L — SIGNIFICANT CHANGE UP (ref 0–41)
ANION GAP SERPL CALC-SCNC: 14 MMOL/L — SIGNIFICANT CHANGE UP (ref 7–14)
AST SERPL-CCNC: 10 U/L — SIGNIFICANT CHANGE UP (ref 0–41)
B-OH-BUTYR SERPL-SCNC: 1.5 MMOL/L — HIGH
BASOPHILS # BLD AUTO: 0.04 K/UL — SIGNIFICANT CHANGE UP (ref 0–0.2)
BASOPHILS NFR BLD AUTO: 0.3 % — SIGNIFICANT CHANGE UP (ref 0–1)
BILIRUB SERPL-MCNC: 0.4 MG/DL — SIGNIFICANT CHANGE UP (ref 0.2–1.2)
BUN SERPL-MCNC: 9 MG/DL — LOW (ref 10–20)
CALCIUM SERPL-MCNC: 9.2 MG/DL — SIGNIFICANT CHANGE UP (ref 8.4–10.4)
CHLORIDE SERPL-SCNC: 95 MMOL/L — LOW (ref 98–110)
CO2 SERPL-SCNC: 24 MMOL/L — SIGNIFICANT CHANGE UP (ref 17–32)
CREAT SERPL-MCNC: 0.9 MG/DL — SIGNIFICANT CHANGE UP (ref 0.7–1.5)
EGFR: 108 ML/MIN/1.73M2 — SIGNIFICANT CHANGE UP
EOSINOPHIL # BLD AUTO: 0.71 K/UL — HIGH (ref 0–0.7)
EOSINOPHIL NFR BLD AUTO: 6.1 % — SIGNIFICANT CHANGE UP (ref 0–8)
GLUCOSE SERPL-MCNC: 501 MG/DL — CRITICAL HIGH (ref 70–99)
HCT VFR BLD CALC: 45.3 % — SIGNIFICANT CHANGE UP (ref 42–52)
HGB BLD-MCNC: 16.7 G/DL — SIGNIFICANT CHANGE UP (ref 14–18)
IMM GRANULOCYTES NFR BLD AUTO: 0.6 % — HIGH (ref 0.1–0.3)
LIDOCAIN IGE QN: 75 U/L — HIGH (ref 7–60)
LYMPHOCYTES # BLD AUTO: 1.35 K/UL — SIGNIFICANT CHANGE UP (ref 1.2–3.4)
LYMPHOCYTES # BLD AUTO: 11.6 % — LOW (ref 20.5–51.1)
MCHC RBC-ENTMCNC: 31.6 PG — HIGH (ref 27–31)
MCHC RBC-ENTMCNC: 36.9 G/DL — SIGNIFICANT CHANGE UP (ref 32–37)
MCV RBC AUTO: 85.8 FL — SIGNIFICANT CHANGE UP (ref 80–94)
MONOCYTES # BLD AUTO: 0.56 K/UL — SIGNIFICANT CHANGE UP (ref 0.1–0.6)
MONOCYTES NFR BLD AUTO: 4.8 % — SIGNIFICANT CHANGE UP (ref 1.7–9.3)
NEUTROPHILS # BLD AUTO: 8.95 K/UL — HIGH (ref 1.4–6.5)
NEUTROPHILS NFR BLD AUTO: 76.6 % — HIGH (ref 42.2–75.2)
NRBC # BLD: 0 /100 WBCS — SIGNIFICANT CHANGE UP (ref 0–0)
PLATELET # BLD AUTO: 243 K/UL — SIGNIFICANT CHANGE UP (ref 130–400)
PMV BLD: 11.5 FL — HIGH (ref 7.4–10.4)
POTASSIUM SERPL-MCNC: 4 MMOL/L — SIGNIFICANT CHANGE UP (ref 3.5–5)
POTASSIUM SERPL-SCNC: 4 MMOL/L — SIGNIFICANT CHANGE UP (ref 3.5–5)
PROT SERPL-MCNC: 7.2 G/DL — SIGNIFICANT CHANGE UP (ref 6–8)
RBC # BLD: 5.28 M/UL — SIGNIFICANT CHANGE UP (ref 4.7–6.1)
RBC # FLD: 11.9 % — SIGNIFICANT CHANGE UP (ref 11.5–14.5)
SODIUM SERPL-SCNC: 133 MMOL/L — LOW (ref 135–146)
WBC # BLD: 11.68 K/UL — HIGH (ref 4.8–10.8)
WBC # FLD AUTO: 11.68 K/UL — HIGH (ref 4.8–10.8)

## 2024-12-20 PROCEDURE — 85025 COMPLETE CBC W/AUTO DIFF WBC: CPT

## 2024-12-20 PROCEDURE — 99284 EMERGENCY DEPT VISIT MOD MDM: CPT | Mod: 25

## 2024-12-20 PROCEDURE — 80053 COMPREHEN METABOLIC PANEL: CPT

## 2024-12-20 PROCEDURE — 82010 KETONE BODYS QUAN: CPT

## 2024-12-20 PROCEDURE — 83690 ASSAY OF LIPASE: CPT

## 2024-12-20 PROCEDURE — 74177 CT ABD & PELVIS W/CONTRAST: CPT | Mod: 26,MC

## 2024-12-20 PROCEDURE — 36415 COLL VENOUS BLD VENIPUNCTURE: CPT

## 2024-12-20 PROCEDURE — 99285 EMERGENCY DEPT VISIT HI MDM: CPT

## 2024-12-20 PROCEDURE — 96374 THER/PROPH/DIAG INJ IV PUSH: CPT

## 2024-12-20 PROCEDURE — 82962 GLUCOSE BLOOD TEST: CPT

## 2024-12-20 PROCEDURE — 74177 CT ABD & PELVIS W/CONTRAST: CPT | Mod: MC

## 2024-12-20 RX ORDER — 0.9 % SODIUM CHLORIDE 0.9 %
1000 INTRAVENOUS SOLUTION INTRAVENOUS ONCE
Refills: 0 | Status: COMPLETED | OUTPATIENT
Start: 2024-12-20 | End: 2024-12-20

## 2024-12-20 RX ORDER — METOCLOPRAMIDE HYDROCHLORIDE 10 MG/1
10 TABLET ORAL ONCE
Refills: 0 | Status: COMPLETED | OUTPATIENT
Start: 2024-12-20 | End: 2024-12-20

## 2024-12-20 RX ORDER — AMOXICILLIN/POTASSIUM CLAV 250-125 MG
1 TABLET ORAL ONCE
Refills: 0 | Status: COMPLETED | OUTPATIENT
Start: 2024-12-20 | End: 2024-12-20

## 2024-12-20 RX ORDER — FAMOTIDINE 20 MG/1
20 TABLET, FILM COATED ORAL ONCE
Refills: 0 | Status: COMPLETED | OUTPATIENT
Start: 2024-12-20 | End: 2024-12-20

## 2024-12-20 RX ORDER — AMOXICILLIN/POTASSIUM CLAV 250-125 MG
1 TABLET ORAL
Qty: 14 | Refills: 0
Start: 2024-12-20 | End: 2024-12-26

## 2024-12-20 RX ORDER — INSULIN REG, HUM S-S BUFF 100/ML
8 VIAL (ML) INJECTION ONCE
Refills: 0 | Status: DISCONTINUED | OUTPATIENT
Start: 2024-12-20 | End: 2024-12-20

## 2024-12-20 RX ORDER — POTASSIUM CHLORIDE 600 MG/1
40 TABLET, EXTENDED RELEASE ORAL ONCE
Refills: 0 | Status: COMPLETED | OUTPATIENT
Start: 2024-12-20 | End: 2024-12-20

## 2024-12-20 RX ORDER — MAGNESIUM, ALUMINUM HYDROXIDE 200-225/5
30 SUSPENSION, ORAL (FINAL DOSE FORM) ORAL ONCE
Refills: 0 | Status: COMPLETED | OUTPATIENT
Start: 2024-12-20 | End: 2024-12-20

## 2024-12-20 RX ORDER — ACETAMINOPHEN 500MG 500 MG/1
650 TABLET, COATED ORAL ONCE
Refills: 0 | Status: COMPLETED | OUTPATIENT
Start: 2024-12-20 | End: 2024-12-20

## 2024-12-20 RX ADMIN — Medication 1 TABLET(S): at 22:59

## 2024-12-20 RX ADMIN — ACETAMINOPHEN 500MG 650 MILLIGRAM(S): 500 TABLET, COATED ORAL at 22:00

## 2024-12-20 RX ADMIN — ACETAMINOPHEN 500MG 325 MILLIGRAM(S): 500 TABLET, COATED ORAL at 20:00

## 2024-12-20 RX ADMIN — Medication 30 MILLILITER(S): at 20:00

## 2024-12-20 RX ADMIN — Medication 1000 MILLILITER(S): at 20:00

## 2024-12-20 RX ADMIN — Medication 500 MILLIGRAM(S): at 22:59

## 2024-12-20 RX ADMIN — POTASSIUM CHLORIDE 20 MILLIEQUIVALENT(S): 600 TABLET, EXTENDED RELEASE ORAL at 21:45

## 2024-12-20 RX ADMIN — Medication 1000 MILLILITER(S): at 19:59

## 2024-12-20 RX ADMIN — METOCLOPRAMIDE HYDROCHLORIDE 10 MILLIGRAM(S): 10 TABLET ORAL at 20:00

## 2024-12-20 RX ADMIN — FAMOTIDINE 20 MILLIGRAM(S): 20 TABLET, FILM COATED ORAL at 19:59

## 2024-12-20 NOTE — ED PROVIDER NOTE - OBJECTIVE STATEMENT
44-year-old male with no past medical history presenting for evaluation of diffuse abdominal pain x 1 week associated with nausea, vomiting x 1, diarrhea.  Symptoms are moderate. Reports also having a headache over the last 3 days associated with blurred vision with driving. No dizziness. No eye pain. No alleviating or aggravating factors. No cp, sob, fever, chills, back pain, urinary symptoms, paresthesias, or weakness.

## 2024-12-20 NOTE — ED PROVIDER NOTE - ATTENDING APP SHARED VISIT CONTRIBUTION OF CARE
Discharge Order is in.  requested Eliquis coupons for Pt d/t small  Pulmonary Embolus. Coupons given to RN for discharge.   [place]  [place] [address] [place]  [phone] I saw and evaluated the patient on my own.  Briefly, I have the following impression and plan...  abndomianl pain 1 week, nonspecific. unlikely surgical  A&Ox3, CN 2-11 intact, moving all extremities, 5/5 strength, equal sensation bilaterally, normal finger to nose exam. normal steady gait. no nystagmus  needs optometry  Please see MDM for further details.

## 2024-12-20 NOTE — ED ADULT NURSE NOTE - NSFALLUNIVINTERV_ED_ALL_ED
Bed/Stretcher in lowest position, wheels locked, appropriate side rails in place/Call bell, personal items and telephone in reach/Instruct patient to call for assistance before getting out of bed/chair/stretcher/Non-slip footwear applied when patient is off stretcher/[place] to call system/Physically safe environment - no spills, clutter or unnecessary equipment/Purposeful proactive rounding/Room/bathroom lighting operational, light cord in reach

## 2024-12-20 NOTE — ED ADULT NURSE REASSESSMENT NOTE - NS ED NURSE REASSESS COMMENT FT1
Pt reassessed A/O times 4 Vs stable report felling slight better after LR litters is given and other pain medication , MD aware verbalize understanding of instruction given for F/Up care ,left with privet transportation .

## 2024-12-20 NOTE — ED PROVIDER NOTE - PATIENT PORTAL LINK FT
You can access the FollowMyHealth Patient Portal offered by [facility] by registering at the following website: [email or web address]. By joining [employer]’s FollowMyHealth portal, you will also be able to view your health information using other applications (apps) compatible with our system.

## 2024-12-20 NOTE — ED PROVIDER NOTE - CLINICAL SUMMARY MEDICAL DECISION MAKING FREE TEXT BOX
44-year-old male with no past medical history presenting with 1 week of cramping abdominal pain.  Had some nausea but no vomiting or diarrhea.  No fevers chills.  No alcohol use or smoking history.  Reports low p.o. intake.  Reports feeling lightheaded with some head heaviness.  Also feels like he cannot see distances just noticed this over the past week as well.  On exam he is well-appearing well-hydrated.  Abdomen soft nontender no focal tenderness in any of the quadrants.  He is ambulatory with a normal neuroexam.  His visual acuity is 20/40 bilaterally.  He does not have any visual field deficits.  Plan to check basic blood work give symptomatic care and reassess.  ***   Neurologic symptoms likely related to low p.o. intake.    Encourage patient to follow-up with his optometrist for visual acuity checks likely needs lens correction 44-year-old male with no past medical history presenting with 1 week of cramping abdominal pain.  Had some nausea but no vomiting or diarrhea.  No fevers chills.  No alcohol use or smoking history.  Reports low p.o. intake.  Reports feeling lightheaded with some head heaviness.  Also feels like he cannot see distances just noticed this over the past week as well.  On exam he is well-appearing well-hydrated.  Abdomen soft nontender no focal tenderness in any of the quadrants.  He is ambulatory with a normal neuroexam.  His visual acuity is 20/40 bilaterally.  He does not have any visual field deficits   Neurologic symptoms likely related to low p.o. intake.  Do not feel his head CT is n warranted given no focal neurologic deficits.  Patient determined to have new onset diabetes glucose initially 500 given 2 L of fluids repeat fingerstick in the 300s.  Patient found to have colitis and a liver lesion on his CT scan.  Patient has no diarrhea or vomiting no fevers or chills.  Prescribed Augmentin to the pharmacy to cover for colitis.  Tolerating p.o. in the ED.  Patient has no anion gap or elevated bicarb.  He is well-appearing with normal respiratory rate so do not feel he is in DKA.  Patient given instructions about taking new medication of metformin.  Encouraged to follow-up with PCP next week.  Also given a GI referral for his liver lesion  Encourage patient to follow-up with his optometrist for visual acuity checks likely needs lens correctionAn ophthalmology examination in the setting of new diabetic diagnosis.

## 2024-12-20 NOTE — ED PROVIDER NOTE - NSPTACCESSSVCSAPPTDETAILS_ED_ALL_ED_FT
new diabetes  ophtho for vision correction and new Diabetes  GI liver lesion and colitis Gastroenterology for liver lesion  new diabetes  ophtho for vision correction and new Diabetes  GI liver lesion and colitis

## 2024-12-20 NOTE — ED ADULT NURSE NOTE - NS ED NURSE DISCH DISPOSITION
Ambulatory Care Coordination Note  2023    Patient Current Location: [place]     ACM contacted the patient by telephone. Verified name and  with patient as identifiers. Provided introduction to self, and explanation of the ACM role. Challenges to be reviewed by the provider   Additional needs identified to be addressed with provider: No  none               Method of communication with provider: none. ACM: Adele Conde, RN    RN-CC outreached patient for cc follow up; UTI. Patient was evaluated by [address] on 8/15; patient states the NP w/DH instructed her to drop off a urine specimen to her pcp office. Patient dropped a sample off on  - urine was + for UTI and tx was initiated. RN-CC confirmed she picked up her antibiotic and is taking as prescribed - no questions or concerns voiced. Per Dr. Radha Lowery note dated 23:    Is following with Dr. Reid Johnson for urology. Does not fully empty her bladder d/t her muscle relaxers. Had recommended self-cath but cannot d/t her low back pain. To repeat urodynamics in 6 months. Patient is scheduled to f/u with Dr. Reid Johnson in [place] and Dr. Ayo Benson on 23. She has been referred to nephrology for hyponatremia and is scheduled to see Dr. Kristopher Maciel on 23. PLAN:    F/u on nephrology appt  F/u on UTI  [facility]  Needs & concerns     Offered patient enrollment in the Remote Patient Monitoring (RPM) program for in-home monitoring: NA. Lab Results       None            Care Coordination Interventions    Referral from Primary Care Provider: Yes  Suggested Interventions and Community Resources  Fall Risk Prevention: In Process (Comment: 22 SIMON)  Medication Assistance Program: Completed (Comment: [employer] 22 SIMON)  Pharmacist: Declined (Comment: 22 SIMON)  Senior Services: Completed (Comment: referral to [facility] 10/12/22)  Social Work: Completed (Comment: [facility] 10/12/22)  Zone Management Tools:  In Process (Comment: 22 SIMON)  Other Discharged

## 2024-12-20 NOTE — ED PROVIDER NOTE - NSFOLLOWUPCLINICS_GEN_ALL_ED_FT
[facility]  Medicine  [address], [place]   Phone: [phone]  Fax:   Follow Up Time: 1-3 Days

## 2024-12-20 NOTE — ED ADULT NURSE NOTE - OBJECTIVE STATEMENT
Pt with C/O epigastric pain on and off for week with headache ,denies N/V/D or constipation Pt with C/O epigastric pain on and off for week with headache ,denies N/V/D or constipation Pt with C./O Vission  changes for 2 weeks ,Pt state she was not seen  n by  any MD out patient

## 2024-12-20 NOTE — ED PROVIDER NOTE - NSFOLLOWUPINSTRUCTIONS_ED_ALL_ED_FT
Our Emergency Department Referral Coordinators will be reaching out to you in the next 24-48 hours from 9:00am to 5:00pm to schedule a follow up appointment. Please expect a phone call from the hospital in that time frame. If you do not receive a call or if you have any questions or concerns, you can reach them at   [phone].     Colitis is inflammation of the colon. Colitis may last a short time (acute) or it may last a long time (chronic).     CAUSES  This condition may be caused by:  Viruses.  Bacteria.  Reactions to medicine.  Certain autoimmune diseases, such as Crohn disease or ulcerative colitis.    SYMPTOMS  Symptoms of this condition include:    Diarrhea.  Passing bloody or tarry stool.  Pain.  Fever.  Vomiting.  Tiredness (fatigue).  Weight loss.  Bloating.  Sudden increase in abdominal pain.  Having fewer bowel movements than usual.    DIAGNOSIS  This condition is diagnosed with a stool test or a blood test. You may also have other tests, including X-rays, a CT scan, or a colonoscopy.    TREATMENT  Treatment may include:    Resting the bowel. This involves not eating or drinking for a period of time.  Fluids that are given through an IV tube.  Medicine for pain and diarrhea.  Antibiotic medicines.  Cortisone medicines.  Surgery.    HOME CARE INSTRUCTIONS  Eating and Drinking    Follow instructions from your health care provider about eating or drinking restrictions.  Drink enough fluid to keep your urine clear or pale yellow.  Work with a dietitian to determine which foods cause your condition to flare up.  Avoid foods that cause flare-ups.  Eat a well-balanced diet.    Medicines    Take over-the-counter and prescription medicines only as told by your health care provider.  If you were prescribed an antibiotic medicine, take it as told by your health care provider. Do not stop taking the antibiotic even if you start to feel better.    General Instructions    Keep all follow-up visits as told by your health care provider. This is important.    SEEK MEDICAL CARE IF:  Your symptoms do not go away.  You develop new symptoms.    SEEK IMMEDIATE MEDICAL CARE IF:  You have a fever that does not go away with treatment.  You develop chills.  You have extreme weakness, fainting, or dehydration.  You have repeated vomiting.  You develop severe pain in your abdomen.  You pass bloody or tarry stool.

## 2024-12-20 NOTE — ED PROVIDER NOTE - PROGRESS NOTE DETAILS
Patient tolerating PO. Discussed strict return precautions. Dr Amador 12/21 10am - Upon review of chart, I noticed elevated beta hyrdroxy came back after patient discharge. No VBG was sent but was ordered. Likely has hyperglycemic ketosis without AG. I called patient to let him know of the result and encouraged him to return to the ER. Patient reports he is feeling better. Now eating and abdominal pain improved. He understands his new diagnosis of DM and says he is picking up his medication this morning and going to call the medicine clinic to make an appt for next week. I counseled patient to take metformin 500mg once a day for 1 week and then twice a day. Pt understands. He says he will return to ER if there are any issues.

## 2024-12-23 ENCOUNTER — INPATIENT (INPATIENT)
Facility: HOSPITAL | Age: 44
LOS: 2 days | Discharge: HOME | DRG: 420 | End: 2024-12-26
Attending: HOSPITALIST | Admitting: INTERNAL MEDICINE
Payer: MEDICAID

## 2024-12-23 VITALS
OXYGEN SATURATION: 98 % | DIASTOLIC BLOOD PRESSURE: 85 MMHG | WEIGHT: 177.03 LBS | SYSTOLIC BLOOD PRESSURE: 126 MMHG | RESPIRATION RATE: 18 BRPM | TEMPERATURE: 98 F | HEART RATE: 98 BPM

## 2024-12-23 LAB
ALBUMIN SERPL ELPH-MCNC: 4.4 G/DL — SIGNIFICANT CHANGE UP (ref 3.5–5.2)
ALP SERPL-CCNC: 213 U/L — HIGH (ref 30–115)
ALT FLD-CCNC: 16 U/L — SIGNIFICANT CHANGE UP (ref 0–41)
ANION GAP SERPL CALC-SCNC: 15 MMOL/L — HIGH (ref 7–14)
ANION GAP SERPL CALC-SCNC: 17 MMOL/L — HIGH (ref 7–14)
AST SERPL-CCNC: 12 U/L — SIGNIFICANT CHANGE UP (ref 0–41)
B-OH-BUTYR SERPL-SCNC: 4.3 MMOL/L — HIGH
BASOPHILS # BLD AUTO: 0.07 K/UL — SIGNIFICANT CHANGE UP (ref 0–0.2)
BASOPHILS NFR BLD AUTO: 0.5 % — SIGNIFICANT CHANGE UP (ref 0–1)
BILIRUB SERPL-MCNC: 0.2 MG/DL — SIGNIFICANT CHANGE UP (ref 0.2–1.2)
BUN SERPL-MCNC: 11 MG/DL — SIGNIFICANT CHANGE UP (ref 10–20)
BUN SERPL-MCNC: 13 MG/DL — SIGNIFICANT CHANGE UP (ref 10–20)
CALCIUM SERPL-MCNC: 8.9 MG/DL — SIGNIFICANT CHANGE UP (ref 8.4–10.5)
CALCIUM SERPL-MCNC: 9.1 MG/DL — SIGNIFICANT CHANGE UP (ref 8.4–10.5)
CHLORIDE SERPL-SCNC: 100 MMOL/L — SIGNIFICANT CHANGE UP (ref 98–110)
CHLORIDE SERPL-SCNC: 92 MMOL/L — LOW (ref 98–110)
CO2 SERPL-SCNC: 21 MMOL/L — SIGNIFICANT CHANGE UP (ref 17–32)
CO2 SERPL-SCNC: 23 MMOL/L — SIGNIFICANT CHANGE UP (ref 17–32)
CREAT SERPL-MCNC: 0.6 MG/DL — LOW (ref 0.7–1.5)
CREAT SERPL-MCNC: 0.9 MG/DL — SIGNIFICANT CHANGE UP (ref 0.7–1.5)
EGFR: 108 ML/MIN/1.73M2 — SIGNIFICANT CHANGE UP
EGFR: 122 ML/MIN/1.73M2 — SIGNIFICANT CHANGE UP
EOSINOPHIL # BLD AUTO: 1.95 K/UL — HIGH (ref 0–0.7)
EOSINOPHIL NFR BLD AUTO: 14.6 % — HIGH (ref 0–8)
GAS PNL BLDV: SIGNIFICANT CHANGE UP
GLUCOSE BLDC GLUCOMTR-MCNC: 125 MG/DL — HIGH (ref 70–99)
GLUCOSE BLDC GLUCOMTR-MCNC: 125 MG/DL — HIGH (ref 70–99)
GLUCOSE BLDC GLUCOMTR-MCNC: 136 MG/DL — HIGH (ref 70–99)
GLUCOSE BLDC GLUCOMTR-MCNC: 175 MG/DL — HIGH (ref 70–99)
GLUCOSE BLDC GLUCOMTR-MCNC: 225 MG/DL — HIGH (ref 70–99)
GLUCOSE BLDC GLUCOMTR-MCNC: 230 MG/DL — HIGH (ref 70–99)
GLUCOSE BLDC GLUCOMTR-MCNC: 256 MG/DL — HIGH (ref 70–99)
GLUCOSE SERPL-MCNC: 131 MG/DL — HIGH (ref 70–99)
GLUCOSE SERPL-MCNC: 405 MG/DL — HIGH (ref 70–99)
HCT VFR BLD CALC: 45.8 % — SIGNIFICANT CHANGE UP (ref 42–52)
HGB BLD-MCNC: 16.2 G/DL — SIGNIFICANT CHANGE UP (ref 14–18)
IMM GRANULOCYTES NFR BLD AUTO: 1.6 % — HIGH (ref 0.1–0.3)
LIDOCAIN IGE QN: 110 U/L — HIGH (ref 7–60)
LYMPHOCYTES # BLD AUTO: 1.07 K/UL — LOW (ref 1.2–3.4)
LYMPHOCYTES # BLD AUTO: 8 % — LOW (ref 20.5–51.1)
MCHC RBC-ENTMCNC: 30.9 PG — SIGNIFICANT CHANGE UP (ref 27–31)
MCHC RBC-ENTMCNC: 35.4 G/DL — SIGNIFICANT CHANGE UP (ref 32–37)
MCV RBC AUTO: 87.4 FL — SIGNIFICANT CHANGE UP (ref 80–94)
MONOCYTES # BLD AUTO: 0.54 K/UL — SIGNIFICANT CHANGE UP (ref 0.1–0.6)
MONOCYTES NFR BLD AUTO: 4 % — SIGNIFICANT CHANGE UP (ref 1.7–9.3)
NEUTROPHILS # BLD AUTO: 9.5 K/UL — HIGH (ref 1.4–6.5)
NEUTROPHILS NFR BLD AUTO: 71.3 % — SIGNIFICANT CHANGE UP (ref 42.2–75.2)
NRBC # BLD: 0 /100 WBCS — SIGNIFICANT CHANGE UP (ref 0–0)
PLATELET # BLD AUTO: 221 K/UL — SIGNIFICANT CHANGE UP (ref 130–400)
PMV BLD: 12.3 FL — HIGH (ref 7.4–10.4)
POTASSIUM SERPL-MCNC: 3.3 MMOL/L — LOW (ref 3.5–5)
POTASSIUM SERPL-MCNC: 4.4 MMOL/L — SIGNIFICANT CHANGE UP (ref 3.5–5)
POTASSIUM SERPL-SCNC: 3.3 MMOL/L — LOW (ref 3.5–5)
POTASSIUM SERPL-SCNC: 4.4 MMOL/L — SIGNIFICANT CHANGE UP (ref 3.5–5)
PROT SERPL-MCNC: 6.8 G/DL — SIGNIFICANT CHANGE UP (ref 6–8)
RBC # BLD: 5.24 M/UL — SIGNIFICANT CHANGE UP (ref 4.7–6.1)
RBC # FLD: 12 % — SIGNIFICANT CHANGE UP (ref 11.5–14.5)
SODIUM SERPL-SCNC: 130 MMOL/L — LOW (ref 135–146)
SODIUM SERPL-SCNC: 138 MMOL/L — SIGNIFICANT CHANGE UP (ref 135–146)
WBC # BLD: 13.35 K/UL — HIGH (ref 4.8–10.8)
WBC # FLD AUTO: 13.35 K/UL — HIGH (ref 4.8–10.8)

## 2024-12-23 PROCEDURE — 80061 LIPID PANEL: CPT

## 2024-12-23 PROCEDURE — 83036 HEMOGLOBIN GLYCOSYLATED A1C: CPT

## 2024-12-23 PROCEDURE — 85025 COMPLETE CBC W/AUTO DIFF WBC: CPT

## 2024-12-23 PROCEDURE — 87077 CULTURE AEROBIC IDENTIFY: CPT

## 2024-12-23 PROCEDURE — 87045 FECES CULTURE AEROBIC BACT: CPT

## 2024-12-23 PROCEDURE — 83735 ASSAY OF MAGNESIUM: CPT

## 2024-12-23 PROCEDURE — 80053 COMPREHEN METABOLIC PANEL: CPT

## 2024-12-23 PROCEDURE — 36415 COLL VENOUS BLD VENIPUNCTURE: CPT

## 2024-12-23 PROCEDURE — 87507 IADNA-DNA/RNA PROBE TQ 12-25: CPT

## 2024-12-23 PROCEDURE — 80048 BASIC METABOLIC PNL TOTAL CA: CPT

## 2024-12-23 PROCEDURE — 82962 GLUCOSE BLOOD TEST: CPT

## 2024-12-23 PROCEDURE — 87046 STOOL CULTR AEROBIC BACT EA: CPT

## 2024-12-23 PROCEDURE — 99291 CRITICAL CARE FIRST HOUR: CPT

## 2024-12-23 RX ORDER — CEFTRIAXONE SODIUM 1 G/1
1000 INJECTION, POWDER, FOR SOLUTION INTRAMUSCULAR; INTRAVENOUS EVERY 24 HOURS
Refills: 0 | Status: DISCONTINUED | OUTPATIENT
Start: 2024-12-23 | End: 2024-12-26

## 2024-12-23 RX ORDER — SODIUM CHLORIDE 9 MG/ML
1000 INJECTION, SOLUTION INTRAVENOUS
Refills: 0 | Status: DISCONTINUED | OUTPATIENT
Start: 2024-12-23 | End: 2024-12-23

## 2024-12-23 RX ORDER — ONDANSETRON 4 MG/1
4 TABLET ORAL EVERY 8 HOURS
Refills: 0 | Status: DISCONTINUED | OUTPATIENT
Start: 2024-12-23 | End: 2024-12-26

## 2024-12-23 RX ORDER — POTASSIUM CHLORIDE 600 MG/1
20 TABLET, FILM COATED, EXTENDED RELEASE ORAL ONCE
Refills: 0 | Status: COMPLETED | OUTPATIENT
Start: 2024-12-23 | End: 2024-12-23

## 2024-12-23 RX ORDER — METRONIDAZOLE 250 MG/1
TABLET ORAL
Refills: 0 | Status: DISCONTINUED | OUTPATIENT
Start: 2024-12-23 | End: 2024-12-26

## 2024-12-23 RX ORDER — MAG HYDROX/ALUMINUM HYD/SIMETH 200-200-20
30 SUSPENSION, ORAL (FINAL DOSE FORM) ORAL EVERY 4 HOURS
Refills: 0 | Status: DISCONTINUED | OUTPATIENT
Start: 2024-12-23 | End: 2024-12-26

## 2024-12-23 RX ORDER — POTASSIUM CHLORIDE 600 MG/1
40 TABLET, FILM COATED, EXTENDED RELEASE ORAL ONCE
Refills: 0 | Status: COMPLETED | OUTPATIENT
Start: 2024-12-23 | End: 2024-12-23

## 2024-12-23 RX ORDER — METRONIDAZOLE 250 MG/1
500 TABLET ORAL EVERY 8 HOURS
Refills: 0 | Status: DISCONTINUED | OUTPATIENT
Start: 2024-12-24 | End: 2024-12-26

## 2024-12-23 RX ORDER — SODIUM CHLORIDE 9 MG/ML
1000 INJECTION, SOLUTION INTRAVENOUS ONCE
Refills: 0 | Status: COMPLETED | OUTPATIENT
Start: 2024-12-23 | End: 2024-12-23

## 2024-12-23 RX ORDER — SODIUM CHLORIDE, SODIUM GLUCONATE, SODIUM ACETATE, POTASSIUM CHLORIDE AND MAGNESIUM CHLORIDE 30; 37; 368; 526; 502 MG/100ML; MG/100ML; MG/100ML; MG/100ML; MG/100ML
1000 INJECTION, SOLUTION INTRAVENOUS
Refills: 0 | Status: DISCONTINUED | OUTPATIENT
Start: 2024-12-23 | End: 2024-12-24

## 2024-12-23 RX ORDER — ENOXAPARIN SODIUM 60 MG/.6ML
40 INJECTION INTRAVENOUS; SUBCUTANEOUS EVERY 24 HOURS
Refills: 0 | Status: DISCONTINUED | OUTPATIENT
Start: 2024-12-23 | End: 2024-12-26

## 2024-12-23 RX ORDER — ACETAMINOPHEN 80 MG/.8ML
650 SOLUTION/ DROPS ORAL EVERY 6 HOURS
Refills: 0 | Status: DISCONTINUED | OUTPATIENT
Start: 2024-12-23 | End: 2024-12-26

## 2024-12-23 RX ORDER — INSULIN HUMAN 100 [IU]/ML
4 INJECTION, SOLUTION SUBCUTANEOUS
Qty: 100 | Refills: 0 | Status: DISCONTINUED | OUTPATIENT
Start: 2024-12-23 | End: 2024-12-24

## 2024-12-23 RX ORDER — GINKGO BILOBA 40 MG
3 CAPSULE ORAL AT BEDTIME
Refills: 0 | Status: DISCONTINUED | OUTPATIENT
Start: 2024-12-23 | End: 2024-12-26

## 2024-12-23 RX ORDER — ONDANSETRON 4 MG/1
4 TABLET ORAL ONCE
Refills: 0 | Status: COMPLETED | OUTPATIENT
Start: 2024-12-23 | End: 2024-12-23

## 2024-12-23 RX ORDER — METRONIDAZOLE 250 MG/1
500 TABLET ORAL ONCE
Refills: 0 | Status: COMPLETED | OUTPATIENT
Start: 2024-12-23 | End: 2024-12-23

## 2024-12-23 RX ADMIN — SODIUM CHLORIDE, SODIUM GLUCONATE, SODIUM ACETATE, POTASSIUM CHLORIDE AND MAGNESIUM CHLORIDE 150 MILLILITER(S): 30; 37; 368; 526; 502 INJECTION, SOLUTION INTRAVENOUS at 20:37

## 2024-12-23 RX ADMIN — POTASSIUM CHLORIDE 50 MILLIEQUIVALENT(S): 600 TABLET, FILM COATED, EXTENDED RELEASE ORAL at 23:42

## 2024-12-23 RX ADMIN — SODIUM CHLORIDE 1000 MILLILITER(S): 9 INJECTION, SOLUTION INTRAVENOUS at 13:07

## 2024-12-23 RX ADMIN — POTASSIUM CHLORIDE 40 MILLIEQUIVALENT(S): 600 TABLET, FILM COATED, EXTENDED RELEASE ORAL at 23:42

## 2024-12-23 RX ADMIN — METRONIDAZOLE 100 MILLIGRAM(S): 250 TABLET ORAL at 19:53

## 2024-12-23 RX ADMIN — INSULIN HUMAN 8 UNIT(S)/HR: 100 INJECTION, SOLUTION SUBCUTANEOUS at 17:32

## 2024-12-23 RX ADMIN — ONDANSETRON 4 MILLIGRAM(S): 4 TABLET ORAL at 13:07

## 2024-12-23 RX ADMIN — INSULIN HUMAN 4 UNIT(S)/HR: 100 INJECTION, SOLUTION SUBCUTANEOUS at 20:40

## 2024-12-23 RX ADMIN — SODIUM CHLORIDE 1000 MILLILITER(S): 9 INJECTION, SOLUTION INTRAVENOUS at 16:35

## 2024-12-23 NOTE — ED PROVIDER NOTE - CRITICAL CARE ATTENDING CONTRIBUTION TO CARE
pt with abdominal pain diarrhea, nausea/vomitting, 2 days recent DC with colitis    RUQ, labs, reassess    I personally saw the patient. ALTHEA Drake and I provided critical care for a total of 35 minutes. I provided a substantive portion of the care and the majority of the critical care time.

## 2024-12-23 NOTE — ED PROVIDER NOTE - IV ALTEPLASE EXCL REL HIDDEN
"Pt c/o CP. SOB that started tonight while doing \"nothing\".  Denies nausea, cough, fever.   "
show

## 2024-12-23 NOTE — ED PROVIDER NOTE - ATTENDING APP SHARED VISIT CONTRIBUTION OF CARE
pt with abdominal pain diarrhea, nausea/vomitting, 2 days recent DC with colitis    RUQ, labs, reassess

## 2024-12-23 NOTE — ED PROVIDER NOTE - NS ED MD DISPO SPECIAL CONSIDERATION1
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

## 2024-12-23 NOTE — H&P ADULT - NSHPLABSRESULTS_GEN_ALL_CORE
.  LABS:                         16.2   13.35 )-----------( 221      ( 23 Dec 2024 13:00 )             45.8     12-23    130[L]  |  92[L]  |  13  ----------------------------<  405[H]  4.4   |  21  |  0.9    Ca    9.1      23 Dec 2024 13:00    TPro  6.8  /  Alb  4.4  /  TBili  0.2  /  DBili  x   /  AST  12  /  ALT  16  /  AlkPhos  213[H]  12-23      Urinalysis Basic - ( 23 Dec 2024 13:00 )    Color: x / Appearance: x / SG: x / pH: x  Gluc: 405 mg/dL / Ketone: x  / Bili: x / Urobili: x   Blood: x / Protein: x / Nitrite: x   Leuk Esterase: x / RBC: x / WBC x   Sq Epi: x / Non Sq Epi: x / Bacteria: x            RADIOLOGY, EKG & ADDITIONAL TESTS: Reviewed.

## 2024-12-23 NOTE — ED PROVIDER NOTE - OBJECTIVE STATEMENT
Patient is a 44-year-old male coming to ED for abdominal pain and diarrhea.  Patient reports x 2 weeks of generalized upper abdominal discomfort, reports 3-5 episodes of nonbloody diarrhea daily, associated nausea no vomiting.  Of note patient was seen in ED 2 days ago for same symptoms, was found to have colitis but reported feeling better after medications in ED, has been taking antibiotics as prescribed.  Returning to ED because abdominal discomfort and diarrhea returned.  Denies fever, vomiting, constipation, urinary symptoms, testicular pain, rash, recent travel, chest pain, shortness of breath, abdominal surgeries

## 2024-12-23 NOTE — H&P ADULT - ATTENDING COMMENTS
events noted, presented with abd pain / diarrhea, CT AP colitis, found to be in DKA, mild, BHB high, HAGMA, on insulin drip, IVF, AG improved, change to sq, change IVF to  CC/H, Endo eval, abx, stool studies, FLOOR

## 2024-12-23 NOTE — ED ADULT NURSE REASSESSMENT NOTE - NS ED NURSE REASSESS COMMENT FT1
Insulin drip changed from 6u/hr to 4u/h as per DKA insulin protocol & by MD request to follow protocol. MD #[id number] made aware.

## 2024-12-23 NOTE — ED PROVIDER NOTE - NS ED ATTENDING STATEMENT MOD
This was a shared visit with the LACHELLE. I reviewed and verified the documentation. I have personally provided the amount of critical care time documented below excluding time spent on separate procedures.

## 2024-12-23 NOTE — ED ADULT NURSE NOTE - NSFALLUNIVINTERV_ED_ALL_ED
Bed/Stretcher in lowest position, wheels locked, appropriate side rails in place/Call bell, personal items and telephone in reach/Instruct patient to call for assistance before getting out of bed/chair/stretcher/Non-slip footwear applied when patient is off stretcher/[place] to call system/Physically safe environment - no spills, clutter or unnecessary equipment/Purposeful proactive rounding/Room/bathroom lighting operational, light cord in reach

## 2024-12-23 NOTE — H&P ADULT - HISTORY OF PRESENT ILLNESS
45yo M no known PMH (has not seen a PCP in years) coming to ED for abdominal pain and diarrhea.  Patient reports x 2 weeks of generalized upper abdominal discomfort, reports 3-5 episodes of nonbloody diarrhea daily, associated nausea no vomiting. Worsens w/ food.  Of note patient was seen in ED 2 days ago for same symptoms, was found to have colitis but reported feeling better after medications in ED, has been taking antibiotics as prescribed.  Returning to ED because abdominal discomfort and diarrhea returned.  Denies fever, vomiting, constipation, urinary symptoms, testicular pain, rash, recent travel, chest pain, shortness of breath, abdominal surgeries.     In the ED,   Vitals: /85, HR 98, RR 18, T 98.2, satting 98% on RA   Labs: WBC 13.35, Hgb 16.2, , Na 130, K 4.4, AG 17, BHB 4.3     Found to be in DKA. Started on insulin drip given IVF and admitted to ICU

## 2024-12-23 NOTE — ED PROVIDER NOTE - PHYSICAL EXAMINATION
CONST: Well appearing in NAD  EYES: EOMI, Sclera and conjunctiva clear.   CARD: Normal S1 S2; Normal rate and rhythm  RESP: Equal BS B/L, No wheezes, rhonchi or rales. No distress  GI: Soft, non-distended, mild TTP epigastric and RUQ  MS: Normal ROM in all extremities. No midline spinal tenderness.  SKIN: Warm, dry, Good turgor  NEURO: A&Ox3, No focal deficits. Strength 5/5 with no sensory deficits. Steady gait

## 2024-12-23 NOTE — ED PROVIDER NOTE - CLINICAL SUMMARY MEDICAL DECISION MAKING FREE TEXT BOX
DKA requiring insulin infusion 2/2 colitis    Independently interpretted by Nimesh Sewell DO:  XR interpretation: No cardiopulmonary disease,   EKG interpretation: no LAURA, NSR    Appropriate medications for patient's presenting complaints were ordered and effects were reassessed.  Patient's external records were reviewed.     Escalation to admission/observation was considered.  At this time, patient requires inpatient hospitalization .    ICU

## 2024-12-23 NOTE — ED ADULT TRIAGE NOTE - MEANS OF ARRIVAL
[facility]  Emergency Department  Faculty Attestation     I performed a history and physical examination of the patient and discussed management with the resident. I reviewed the residents note and agree with the documented findings and plan of care. Any areas of disagreement are noted on the chart. I was personally present for the key portions of any procedures. I have documented in the chart those procedures where I was not present during the key portions. I have reviewed the emergency nurses triage note. I agree with the chief complaint, past medical history, past surgical history, allergies, medications, social and family history as documented unless otherwise noted below. For Physician Assistant/ Nurse Practitioner cases/documentation I have personally evaluated this patient and have completed at least one if not all key elements of the E/M (history, physical exam, and MDM). Additional findings are as noted. Primary Care Physician:  Lucia Davalos MD    Screenings:  [unfilled]    CHIEF COMPLAINT       Chief Complaint   Patient presents with    Cough       RECENT VITALS:   Temp: 98.6 °F (37 °C),  Pulse: 90, Resp: 20, BP: (!) 173/97    LABS:  Labs Reviewed   CBC WITH AUTO DIFFERENTIAL - Abnormal; Notable for the following components:       Result Value    Seg Neutrophils 70 (*)     Lymphocytes 22 (*)     All other components within normal limits   TROPONIN   TROPONIN   D-DIMER, QUANTITATIVE   BASIC METABOLIC PANEL   MAGNESIUM   BRAIN NATRIURETIC PEPTIDE       Radiology  XR CHEST (2 VW)    (Results Pending)         EKG:   EKG Interpretation    Interpreted by me    Rhythm: normal sinus   Rate: normal  Axis: normal  Ectopy: none  Conduction: Right bundle branch block pattern  ST Segments: no acute change  T Waves:  Inversion anteriorly and inferiorly  Q Waves: Inferior  Poor R wave progression anteriorly    Clinical Impression: Right bundle branch block pattern, nonspecific T wave inversion, consider ischemia    Attending Physician Additional  Notes    Has several days of worsening cough with rattling in her chest.  She is unable to produce the sputum. She is taking Tessalon without relief of the cough. She does feel short of breath especially when lying supine. No fevers but she has fatigue. She had COVID last month. She is concerned she may have pneumonia. She is not vaccinated for influenza. She does have her normal pediatric vaccinations. No history of CHF. No leg pain calf swelling immobility. No hemoptysis. She does have history of bronchospasm and has minimal relief with her albuterol inhaler. On exam she is slightly dyspneic, hypertensive, borderline tachycardic, normal saturations 97%. Breath sounds are good, good airflow, no expiratory prolongation. She does have rhonchi and minimal wheeze that clears with coughing. No accessory muscle use or retractions. No JVD or gallop. No edema cords Homans or calf tenderness. Impression is bronchitis consider pneumonia, less likely other cause such as CHF or PE. Plan is chest x-ray, aerosols, laboratory studies, reassess. Consider antibiotics, mucolytic's, anticipate discharge. Luis Angel Sinha.  Antonia Odell MD, [facility]  Attending Emergency  Physician               Rex Pearl MD  02/26/22 [id number] ambulatory

## 2024-12-23 NOTE — H&P ADULT - ASSESSMENT
IMPRESSION:  DKA   Colitis   Suspected DM    PLAN:    CNS: Avoid sedative, mental status at baseline     HEENT: Oral care    PULMONARY:  HOB @ 45 degrees.  Aspiration precautions     CARDIOVASCULAR: LR at 150/hr w/ Keq. Goal directed fluid resuscitation. Off pressors.     GI: NPO for now.  Bowel regimen prn    RENAL:  Follow up lytes.  Correct as needed. Fu Anion gap.     INFECTIOUS DISEASE: Rocephin flagyl to complete abx course for colitis     HEMATOLOGICAL:  DVT prophylaxis. AM CBC.     ENDOCRINE:  q1 FS. on Insulin drip and IVF. When glucose <200 can switch to D5 .45NS at 150. Repeat BMP. When AG close, start lantus, feed patient, and stop drip 2 hours later.     MUSCULOSKELETAL: AAT          IMPRESSION:    DKA   Colitis       PLAN:    CNS: Avoid sedative, mental status at baseline     HEENT: Oral care    PULMONARY:  HOB @ 45 degrees.  Aspiration precautions     CARDIOVASCULAR: LR at 150/hr w/ Keq. Goal directed fluid resuscitation. Off pressors.     GI: NPO for now.  Bowel regimen prn    RENAL:  Follow up lytes.  Correct as needed. Fu Anion gap.     INFECTIOUS DISEASE: Rocephin flagyl to complete abx course for colitis     HEMATOLOGICAL:  DVT prophylaxis. AM CBC.     ENDOCRINE:  q1 FS. on Insulin drip and IVF. When glucose <200 can switch to D5 .45NS at 150. Repeat BMP. When AG close, start lantus, feed patient, and stop drip 2 hours later.     MUSCULOSKELETAL: AAT       MICU

## 2024-12-23 NOTE — ED ADULT TRIAGE NOTE - AS TEMP SITE
CC:  Follow up Diabetic retinopathy     Interval:  Here for follow-up of Type 2 diabetes mellitus with PDR both eyes and macular edema. Possibly inj    Review of systems: started chemo for GI Ca. Was in the ICU because of  kidney failure- emergent dialysis  He has had history of sinus issues worsening this year with allergy season. VA subjectively unchanged, no flashes, no floaters. Last visit he received a left eye injection only.  He is working on walking more which is helping with weight loss.     Lab Results     A1C                      6.3                 6/14/2023     HPI: Frandy Workman is a 59 year old patient status post Cataract extraction intraocular lens left eye  history of Diabetes mellitus for 24 ys . Patient on insulin.  Patient on eliquis because of  Ca  Interval History: s/p CEIOL left eye 1/3/22    Retinal Imaging:  OCT 11/22/23  RE: resolution of central DME, trace INTRARETINAL FLUID;   LE: pinpoint IRF/SRF, stable. Mild Epiretinal membrane     fluorescein angiography 09/13/23  both eyes normal AV and choroidal filling ou. Staining laser scars. No obvious NVE, mild late macula leakage. peripheral leakage and capillary non perfusion.     Optos consistent with clinical exam    ASSESSMENT:     #T2DM   - HbA1c 6.3% (6/2023)  - Blood pressure (<120/80) and blood glucose (HbA1c <7.0, ~6.5 today) control discussed with patient. Patient advised that failure to adequately control each may lead to vision loss. The patient expressed understanding.    # Proliferative diabetic retinopathy left eye; pre-proliferative diabetic retinopathy right eye    # vitreous hemorrhage left eye 10/12/22   Status post Eylea inj left eye   Status post Panretinal laser photocoagulation (PRP) 9.28.22 left eye and Status post scatter PRP right eye 11/02/22     # Diabetic macular edema both eyes   - status post avastin in both eyes; Switch to Eylea 4/24/19 with improvement of Diabetic macular edema     - Tried holding off on  injection 3/29/23, but edema worsened both eyes  - last Eylea right eye; 8/02/23 interval improvement 09/13/23 (9 weeks prior)  -  discussed with to T&E vs closed observation on 9/13/23, He preferred to observe   -Given mild worsening 10/04/23 changed to Vabysmo right eye (10/04/23 #1)  - VA 20/20, trace DME  consider ozurdex in the future if no response to vabysmo    # status post Cataract extraction intraocular lens both eyes   Right eye: 01/24/23; left eye 01/3/23  -IOP WNL today  Retina detachment and endophthalmitis precautions were discussed with the patient (increased blurry vision, drainage, new flashes, floaters or a curtain in the visual field) and was asked to return if any of the those occur    # Dry eye syndrome   Left eye worse than right eye   warm compresses and artificial tears  As needed  - punctal plugs previously left eye - reports this is better     # Status post liver transplant  - tx 11/2019  - severe anemia; s/p transfusion - anemia much improved   - being seen by his primary team    PLAN:- Vabysmo inj right eye #2 11/22/23   - Follow up in 6-8 weeks with Optical Coherence Tomography, no dilation; possible Vabysmo    Beto Goldsmith MD  PGY-3 Ophthalmology    ~~~~~~~~~~~~~~~~~~~~~~~~~~~~~~~~~~   Complete documentation of historical and exam elements from today's encounter can be found in the full encounter summary report (not reduplicated in this progress note).  I personally obtained the chief complaint(s) and history of present illness.  I confirmed and edited as necessary the review of systems, past medical/surgical history, family history, social history, and examination findings as documented by others; and I examined the patient myself.  I personally reviewed the relevant tests, images, and reports as documented above.  I personally reviewed the ophthalmic test(s) associated with this encounter, agree with the interpretation(s) as documented by the resident/fellow, and have edited the  corresponding report(s) as necessary.   I formulated and edited as necessary the assessment and plan and discussed the findings and management plan with the patient and family and No resident or fellow assisted with the procedures performed.  I performed the procedures myself.    Enriqueta Martin MD   of Ophthalmology.  Retina Service   Department of Ophthalmology and Visual Neurosciences   [facility]  Phone: [phone]   Fax: [phone]    oral

## 2024-12-24 LAB
A1C WITH ESTIMATED AVERAGE GLUCOSE RESULT: 10.8 % — HIGH (ref 4–5.6)
ALBUMIN SERPL ELPH-MCNC: 3.5 G/DL — SIGNIFICANT CHANGE UP (ref 3.5–5.2)
ALP SERPL-CCNC: 149 U/L — HIGH (ref 30–115)
ALT FLD-CCNC: 12 U/L — SIGNIFICANT CHANGE UP (ref 0–41)
ANION GAP SERPL CALC-SCNC: 12 MMOL/L — SIGNIFICANT CHANGE UP (ref 7–14)
ANION GAP SERPL CALC-SCNC: 13 MMOL/L — SIGNIFICANT CHANGE UP (ref 7–14)
ANISOCYTOSIS BLD QL: SLIGHT — SIGNIFICANT CHANGE UP
AST SERPL-CCNC: 9 U/L — SIGNIFICANT CHANGE UP (ref 0–41)
BASOPHILS # BLD AUTO: 0 K/UL — SIGNIFICANT CHANGE UP (ref 0–0.2)
BASOPHILS NFR BLD AUTO: 0 % — SIGNIFICANT CHANGE UP (ref 0–1)
BILIRUB SERPL-MCNC: <0.2 MG/DL — SIGNIFICANT CHANGE UP (ref 0.2–1.2)
BUN SERPL-MCNC: 11 MG/DL — SIGNIFICANT CHANGE UP (ref 10–20)
BUN SERPL-MCNC: 9 MG/DL — LOW (ref 10–20)
CALCIUM SERPL-MCNC: 8.4 MG/DL — SIGNIFICANT CHANGE UP (ref 8.4–10.5)
CALCIUM SERPL-MCNC: 8.7 MG/DL — SIGNIFICANT CHANGE UP (ref 8.4–10.5)
CHLORIDE SERPL-SCNC: 101 MMOL/L — SIGNIFICANT CHANGE UP (ref 98–110)
CHLORIDE SERPL-SCNC: 103 MMOL/L — SIGNIFICANT CHANGE UP (ref 98–110)
CHOLEST SERPL-MCNC: 151 MG/DL — SIGNIFICANT CHANGE UP
CO2 SERPL-SCNC: 23 MMOL/L — SIGNIFICANT CHANGE UP (ref 17–32)
CO2 SERPL-SCNC: 23 MMOL/L — SIGNIFICANT CHANGE UP (ref 17–32)
CREAT SERPL-MCNC: 0.6 MG/DL — LOW (ref 0.7–1.5)
CREAT SERPL-MCNC: 0.7 MG/DL — SIGNIFICANT CHANGE UP (ref 0.7–1.5)
EGFR: 117 ML/MIN/1.73M2 — SIGNIFICANT CHANGE UP
EGFR: 122 ML/MIN/1.73M2 — SIGNIFICANT CHANGE UP
EOSINOPHIL # BLD AUTO: 2.59 K/UL — HIGH (ref 0–0.7)
EOSINOPHIL NFR BLD AUTO: 22.8 % — HIGH (ref 0–8)
ESTIMATED AVERAGE GLUCOSE: 263 MG/DL — HIGH (ref 68–114)
GLUCOSE BLDC GLUCOMTR-MCNC: 114 MG/DL — HIGH (ref 70–99)
GLUCOSE BLDC GLUCOMTR-MCNC: 128 MG/DL — HIGH (ref 70–99)
GLUCOSE BLDC GLUCOMTR-MCNC: 129 MG/DL — HIGH (ref 70–99)
GLUCOSE BLDC GLUCOMTR-MCNC: 170 MG/DL — HIGH (ref 70–99)
GLUCOSE BLDC GLUCOMTR-MCNC: 174 MG/DL — HIGH (ref 70–99)
GLUCOSE BLDC GLUCOMTR-MCNC: 193 MG/DL — HIGH (ref 70–99)
GLUCOSE BLDC GLUCOMTR-MCNC: 244 MG/DL — HIGH (ref 70–99)
GLUCOSE BLDC GLUCOMTR-MCNC: 308 MG/DL — HIGH (ref 70–99)
GLUCOSE BLDC GLUCOMTR-MCNC: 339 MG/DL — HIGH (ref 70–99)
GLUCOSE BLDC GLUCOMTR-MCNC: 391 MG/DL — HIGH (ref 70–99)
GLUCOSE SERPL-MCNC: 123 MG/DL — HIGH (ref 70–99)
GLUCOSE SERPL-MCNC: 254 MG/DL — HIGH (ref 70–99)
HCT VFR BLD CALC: 39.4 % — LOW (ref 42–52)
HDLC SERPL-MCNC: 39 MG/DL — LOW
HGB BLD-MCNC: 14.3 G/DL — SIGNIFICANT CHANGE UP (ref 14–18)
LIPID PNL WITH DIRECT LDL SERPL: 94 MG/DL — SIGNIFICANT CHANGE UP
LYMPHOCYTES # BLD AUTO: 1.29 K/UL — SIGNIFICANT CHANGE UP (ref 1.2–3.4)
LYMPHOCYTES # BLD AUTO: 11.4 % — LOW (ref 20.5–51.1)
MAGNESIUM SERPL-MCNC: 1.9 MG/DL — SIGNIFICANT CHANGE UP (ref 1.8–2.4)
MANUAL SMEAR VERIFICATION: SIGNIFICANT CHANGE UP
MCHC RBC-ENTMCNC: 31.2 PG — HIGH (ref 27–31)
MCHC RBC-ENTMCNC: 36.3 G/DL — SIGNIFICANT CHANGE UP (ref 32–37)
MCV RBC AUTO: 86 FL — SIGNIFICANT CHANGE UP (ref 80–94)
MICROCYTES BLD QL: SLIGHT — SIGNIFICANT CHANGE UP
MONOCYTES # BLD AUTO: 0.4 K/UL — SIGNIFICANT CHANGE UP (ref 0.1–0.6)
MONOCYTES NFR BLD AUTO: 3.5 % — SIGNIFICANT CHANGE UP (ref 1.7–9.3)
NEUTROPHILS # BLD AUTO: 6.87 K/UL — HIGH (ref 1.4–6.5)
NEUTROPHILS NFR BLD AUTO: 60.5 % — SIGNIFICANT CHANGE UP (ref 42.2–75.2)
NON HDL CHOLESTEROL: 112 MG/DL — SIGNIFICANT CHANGE UP
PLAT MORPH BLD: NORMAL — SIGNIFICANT CHANGE UP
PLATELET # BLD AUTO: 202 K/UL — SIGNIFICANT CHANGE UP (ref 130–400)
PMV BLD: 11.9 FL — HIGH (ref 7.4–10.4)
POTASSIUM SERPL-MCNC: 3.4 MMOL/L — LOW (ref 3.5–5)
POTASSIUM SERPL-MCNC: 3.7 MMOL/L — SIGNIFICANT CHANGE UP (ref 3.5–5)
POTASSIUM SERPL-SCNC: 3.4 MMOL/L — LOW (ref 3.5–5)
POTASSIUM SERPL-SCNC: 3.7 MMOL/L — SIGNIFICANT CHANGE UP (ref 3.5–5)
PROT SERPL-MCNC: 5.4 G/DL — LOW (ref 6–8)
RBC # BLD: 4.58 M/UL — LOW (ref 4.7–6.1)
RBC # FLD: 12 % — SIGNIFICANT CHANGE UP (ref 11.5–14.5)
RBC BLD AUTO: ABNORMAL
SMUDGE CELLS # BLD: PRESENT — SIGNIFICANT CHANGE UP
SODIUM SERPL-SCNC: 137 MMOL/L — SIGNIFICANT CHANGE UP (ref 135–146)
SODIUM SERPL-SCNC: 138 MMOL/L — SIGNIFICANT CHANGE UP (ref 135–146)
TRIGL SERPL-MCNC: 89 MG/DL — SIGNIFICANT CHANGE UP
VARIANT LYMPHS # BLD: 1.8 % — SIGNIFICANT CHANGE UP (ref 0–5)
WBC # BLD: 11.35 K/UL — HIGH (ref 4.8–10.8)
WBC # FLD AUTO: 11.35 K/UL — HIGH (ref 4.8–10.8)

## 2024-12-24 PROCEDURE — 99291 CRITICAL CARE FIRST HOUR: CPT

## 2024-12-24 RX ORDER — SODIUM CHLORIDE 9 MG/ML
1000 INJECTION, SOLUTION INTRAVENOUS
Refills: 0 | Status: DISCONTINUED | OUTPATIENT
Start: 2024-12-24 | End: 2024-12-26

## 2024-12-24 RX ORDER — INSULIN LISPRO 100/ML
10 VIAL (ML) SUBCUTANEOUS
Refills: 0 | Status: DISCONTINUED | OUTPATIENT
Start: 2024-12-24 | End: 2024-12-25

## 2024-12-24 RX ORDER — INSULIN GLARGINE-YFGN 100 [IU]/ML
30 INJECTION, SOLUTION SUBCUTANEOUS AT BEDTIME
Refills: 0 | Status: DISCONTINUED | OUTPATIENT
Start: 2024-12-24 | End: 2024-12-26

## 2024-12-24 RX ORDER — DEXTROSE MONOHYDRATE 25 G/50ML
15 INJECTION, SOLUTION INTRAVENOUS ONCE
Refills: 0 | Status: DISCONTINUED | OUTPATIENT
Start: 2024-12-24 | End: 2024-12-26

## 2024-12-24 RX ORDER — INSULIN GLARGINE-YFGN 100 [IU]/ML
20 INJECTION, SOLUTION SUBCUTANEOUS ONCE
Refills: 0 | Status: COMPLETED | OUTPATIENT
Start: 2024-12-24 | End: 2024-12-24

## 2024-12-24 RX ORDER — DEXTROSE MONOHYDRATE 25 G/50ML
12.5 INJECTION, SOLUTION INTRAVENOUS ONCE
Refills: 0 | Status: DISCONTINUED | OUTPATIENT
Start: 2024-12-24 | End: 2024-12-26

## 2024-12-24 RX ORDER — DEXTROSE MONOHYDRATE 25 G/50ML
25 INJECTION, SOLUTION INTRAVENOUS ONCE
Refills: 0 | Status: DISCONTINUED | OUTPATIENT
Start: 2024-12-24 | End: 2024-12-26

## 2024-12-24 RX ORDER — INSULIN LISPRO 100/ML
VIAL (ML) SUBCUTANEOUS
Refills: 0 | Status: DISCONTINUED | OUTPATIENT
Start: 2024-12-24 | End: 2024-12-26

## 2024-12-24 RX ORDER — INSULIN LISPRO 100/ML
6 VIAL (ML) SUBCUTANEOUS
Refills: 0 | Status: DISCONTINUED | OUTPATIENT
Start: 2024-12-24 | End: 2024-12-24

## 2024-12-24 RX ORDER — GLUCAGON INJECTION, SOLUTION 0.5 MG/.1ML
1 INJECTION, SOLUTION SUBCUTANEOUS ONCE
Refills: 0 | Status: DISCONTINUED | OUTPATIENT
Start: 2024-12-24 | End: 2024-12-26

## 2024-12-24 RX ADMIN — INSULIN GLARGINE-YFGN 30 UNIT(S): 100 INJECTION, SOLUTION SUBCUTANEOUS at 22:14

## 2024-12-24 RX ADMIN — ENOXAPARIN SODIUM 40 MILLIGRAM(S): 60 INJECTION INTRAVENOUS; SUBCUTANEOUS at 04:13

## 2024-12-24 RX ADMIN — SODIUM CHLORIDE 100 MILLILITER(S): 9 INJECTION, SOLUTION INTRAVENOUS at 12:08

## 2024-12-24 RX ADMIN — Medication 6 UNIT(S): at 08:01

## 2024-12-24 RX ADMIN — INSULIN GLARGINE-YFGN 20 UNIT(S): 100 INJECTION, SOLUTION SUBCUTANEOUS at 04:07

## 2024-12-24 RX ADMIN — Medication 6 UNIT(S): at 12:14

## 2024-12-24 RX ADMIN — METRONIDAZOLE 100 MILLIGRAM(S): 250 TABLET ORAL at 16:34

## 2024-12-24 RX ADMIN — METRONIDAZOLE 100 MILLIGRAM(S): 250 TABLET ORAL at 22:44

## 2024-12-24 RX ADMIN — Medication 10: at 22:15

## 2024-12-24 RX ADMIN — INSULIN HUMAN 5 UNIT(S)/HR: 100 INJECTION, SOLUTION SUBCUTANEOUS at 00:02

## 2024-12-24 RX ADMIN — INSULIN HUMAN 4 UNIT(S)/HR: 100 INJECTION, SOLUTION SUBCUTANEOUS at 04:08

## 2024-12-24 RX ADMIN — Medication 6 UNIT(S): at 16:34

## 2024-12-24 RX ADMIN — SODIUM CHLORIDE, SODIUM GLUCONATE, SODIUM ACETATE, POTASSIUM CHLORIDE AND MAGNESIUM CHLORIDE 150 MILLILITER(S): 30; 37; 368; 526; 502 INJECTION, SOLUTION INTRAVENOUS at 04:13

## 2024-12-24 RX ADMIN — CEFTRIAXONE SODIUM 100 MILLIGRAM(S): 1 INJECTION, POWDER, FOR SOLUTION INTRAMUSCULAR; INTRAVENOUS at 04:13

## 2024-12-24 RX ADMIN — INSULIN HUMAN 6 UNIT(S)/HR: 100 INJECTION, SOLUTION SUBCUTANEOUS at 00:55

## 2024-12-24 RX ADMIN — METRONIDAZOLE 100 MILLIGRAM(S): 250 TABLET ORAL at 06:00

## 2024-12-24 NOTE — CONSULT NOTE ADULT - ASSESSMENT
obesity, stress induced worsening of type 2 diabetes, no evidence for diabetic ketoacidosis, monitor glucose levels, insulin PRN, in the long run, needs strict calorie control to help p[romote weight loss.

## 2024-12-24 NOTE — CONSULT NOTE ADULT - SUBJECTIVE AND OBJECTIVE BOX
Reason for Endocrinology Consult: Diabetes    HPI: 44y Male      PAST MEDICAL & SURGICAL HISTORY:  No pertinent past medical history      No significant past surgical history        FAMILY HISTORY:  No pertinent family history in first degree relatives        SH:  Smoking  Etoh:  Recreational Drugs:    Home Medications:      Current (Non-Endocrine) Meds:  acetaminophen     Tablet .. 650 milliGRAM(s) Oral every 6 hours PRN  aluminum hydroxide/magnesium hydroxide/simethicone Suspension 30 milliLiter(s) Oral every 4 hours PRN  cefTRIAXone   IVPB 1000 milliGRAM(s) IV Intermittent every 24 hours  dextrose 5%. 1000 milliLiter(s) IV Continuous <Continuous>  dextrose 5%. 1000 milliLiter(s) IV Continuous <Continuous>  enoxaparin Injectable 40 milliGRAM(s) SubCutaneous every 24 hours  lactated ringers 1000 milliLiter(s) IV Continuous <Continuous>  melatonin 3 milliGRAM(s) Oral at bedtime PRN  metroNIDAZOLE  IVPB 500 milliGRAM(s) IV Intermittent every 8 hours  metroNIDAZOLE  IVPB      ondansetron Injectable 4 milliGRAM(s) IV Push every 8 hours PRN      Current Endocrine Meds:   dextrose 50% Injectable 25 Gram(s) IV Push once  dextrose 50% Injectable 12.5 Gram(s) IV Push once  dextrose 50% Injectable 25 Gram(s) IV Push once  dextrose Oral Gel 15 Gram(s) Oral once PRN  glucagon  Injectable 1 milliGRAM(s) IntraMuscular once  insulin lispro Injectable (ADMELOG) 6 Unit(s) SubCutaneous three times a day before meals      Allergies:  No Known Allergies      ROS:  Denies the following except as indicated.    General: weight loss/weight gain, decreased appetite, fatigue, fever  Eyes: blurry vision, double vision  ENT: neck swelling, dysphagia, voice changes   CV: palpitations, SOB, chest pain, cough  GI: nausea, vomiting, diarrhea, constipation, abdominal pain  : nocturia,  polyuria, dysuria  Endo: decreased libido, heat/cold intolerance, jitteriness  MSK: arthralgias, myalgias  Skin: rash, dryness, diaphoresis  Neuro: pedal numbness,pedal paresthesias, pedal pain      Weight (kg): 80.3 (12-23 @ 12:22), 88.5 (12-20 @ 17:58)    Vital Signs Last 24 Hrs  T(C): 36.2 (24 Dec 2024 07:36), Max: 37.1 (24 Dec 2024 00:00)  T(F): 97.2 (24 Dec 2024 07:36), Max: 98.7 (24 Dec 2024 00:00)  HR: 78 (24 Dec 2024 07:36) (67 - 98)  BP: 108/64 (24 Dec 2024 07:36) (108/64 - 129/73)  BP(mean): --  RR: 18 (24 Dec 2024 07:36) (15 - 18)  SpO2: 99% (24 Dec 2024 07:36) (96% - 99%)    Parameters below as of 24 Dec 2024 07:36  Patient On (Oxygen Delivery Method): room air      Constitutional: WN/WD in NAD.   Neck: no thyromegaly or palpable thyroid nodules   Respiratory: lungs CTAB.  Cardiovascular: regular rate and rhythm, normal S1 and S2, no audible murmurs  GI: soft, NT/ND, no masses/HSM appreciated.  Ext: no edema, no ulcers, pedal pulses palpable bilaterally  Neurology: no tremor, monofilament sensation intact in feet  Psychiatric: A&O x 3, normal affect/mood.        LABS:                        14.3   11.35 )-----------( 202      ( 24 Dec 2024 06:04 )             39.4     12-24    138  |  103  |  9[L]  ----------------------------<  123[H]  3.7   |  23  |  0.6[L]    Ca    8.7      24 Dec 2024 06:04  Mg     1.9     12-24    TPro  5.4[L]  /  Alb  3.5  /  TBili  <0.2  /  DBili  x   /  AST  9   /  ALT  12  /  AlkPhos  149[H]  12-24      Urinalysis Basic - ( 24 Dec 2024 06:04 )    Color: x / Appearance: x / SG: x / pH: x  Gluc: 123 mg/dL / Ketone: x  / Bili: x / Urobili: x   Blood: x / Protein: x / Nitrite: x   Leuk Esterase: x / RBC: x / WBC x   Sq Epi: x / Non Sq Epi: x / Bacteria: x                                     RADIOLOGY & ADDITIONAL STUDIES:    A/P:44yMale    Above discussed with resident.

## 2024-12-25 ENCOUNTER — TRANSCRIPTION ENCOUNTER (OUTPATIENT)
Age: 44
End: 2024-12-25

## 2024-12-25 LAB
ALBUMIN SERPL ELPH-MCNC: 4.3 G/DL — SIGNIFICANT CHANGE UP (ref 3.5–5.2)
ALP SERPL-CCNC: 190 U/L — HIGH (ref 30–115)
ALT FLD-CCNC: 16 U/L — SIGNIFICANT CHANGE UP (ref 0–41)
ANION GAP SERPL CALC-SCNC: 14 MMOL/L — SIGNIFICANT CHANGE UP (ref 7–14)
AST SERPL-CCNC: 14 U/L — SIGNIFICANT CHANGE UP (ref 0–41)
BASOPHILS # BLD AUTO: 0.08 K/UL — SIGNIFICANT CHANGE UP (ref 0–0.2)
BASOPHILS NFR BLD AUTO: 0.7 % — SIGNIFICANT CHANGE UP (ref 0–1)
BILIRUB SERPL-MCNC: <0.2 MG/DL — SIGNIFICANT CHANGE UP (ref 0.2–1.2)
BUN SERPL-MCNC: 11 MG/DL — SIGNIFICANT CHANGE UP (ref 10–20)
CALCIUM SERPL-MCNC: 9.6 MG/DL — SIGNIFICANT CHANGE UP (ref 8.4–10.5)
CHLORIDE SERPL-SCNC: 93 MMOL/L — LOW (ref 98–110)
CO2 SERPL-SCNC: 25 MMOL/L — SIGNIFICANT CHANGE UP (ref 17–32)
CREAT SERPL-MCNC: 0.9 MG/DL — SIGNIFICANT CHANGE UP (ref 0.7–1.5)
EGFR: 108 ML/MIN/1.73M2 — SIGNIFICANT CHANGE UP
EOSINOPHIL # BLD AUTO: 4.18 K/UL — HIGH (ref 0–0.7)
EOSINOPHIL NFR BLD AUTO: 35.8 % — HIGH (ref 0–8)
GI PCR PANEL: SIGNIFICANT CHANGE UP
GLUCOSE BLDC GLUCOMTR-MCNC: 123 MG/DL — HIGH (ref 70–99)
GLUCOSE BLDC GLUCOMTR-MCNC: 183 MG/DL — HIGH (ref 70–99)
GLUCOSE BLDC GLUCOMTR-MCNC: 228 MG/DL — HIGH (ref 70–99)
GLUCOSE BLDC GLUCOMTR-MCNC: 293 MG/DL — HIGH (ref 70–99)
GLUCOSE BLDC GLUCOMTR-MCNC: 342 MG/DL — HIGH (ref 70–99)
GLUCOSE SERPL-MCNC: 314 MG/DL — HIGH (ref 70–99)
HCT VFR BLD CALC: 45.5 % — SIGNIFICANT CHANGE UP (ref 42–52)
HGB BLD-MCNC: 16.1 G/DL — SIGNIFICANT CHANGE UP (ref 14–18)
IMM GRANULOCYTES NFR BLD AUTO: 1.3 % — HIGH (ref 0.1–0.3)
LYMPHOCYTES # BLD AUTO: 1.52 K/UL — SIGNIFICANT CHANGE UP (ref 1.2–3.4)
LYMPHOCYTES # BLD AUTO: 13 % — LOW (ref 20.5–51.1)
MAGNESIUM SERPL-MCNC: 1.9 MG/DL — SIGNIFICANT CHANGE UP (ref 1.8–2.4)
MCHC RBC-ENTMCNC: 31.4 PG — HIGH (ref 27–31)
MCHC RBC-ENTMCNC: 35.4 G/DL — SIGNIFICANT CHANGE UP (ref 32–37)
MCV RBC AUTO: 88.7 FL — SIGNIFICANT CHANGE UP (ref 80–94)
MONOCYTES # BLD AUTO: 0.46 K/UL — SIGNIFICANT CHANGE UP (ref 0.1–0.6)
MONOCYTES NFR BLD AUTO: 3.9 % — SIGNIFICANT CHANGE UP (ref 1.7–9.3)
NEUTROPHILS # BLD AUTO: 5.28 K/UL — SIGNIFICANT CHANGE UP (ref 1.4–6.5)
NEUTROPHILS NFR BLD AUTO: 45.3 % — SIGNIFICANT CHANGE UP (ref 42.2–75.2)
NRBC # BLD: 0 /100 WBCS — SIGNIFICANT CHANGE UP (ref 0–0)
PLATELET # BLD AUTO: 223 K/UL — SIGNIFICANT CHANGE UP (ref 130–400)
PMV BLD: 12 FL — HIGH (ref 7.4–10.4)
POTASSIUM SERPL-MCNC: 4.4 MMOL/L — SIGNIFICANT CHANGE UP (ref 3.5–5)
POTASSIUM SERPL-SCNC: 4.4 MMOL/L — SIGNIFICANT CHANGE UP (ref 3.5–5)
PROT SERPL-MCNC: 6.6 G/DL — SIGNIFICANT CHANGE UP (ref 6–8)
RBC # BLD: 5.13 M/UL — SIGNIFICANT CHANGE UP (ref 4.7–6.1)
RBC # FLD: 12.1 % — SIGNIFICANT CHANGE UP (ref 11.5–14.5)
SODIUM SERPL-SCNC: 132 MMOL/L — LOW (ref 135–146)
WBC # BLD: 11.67 K/UL — HIGH (ref 4.8–10.8)
WBC # FLD AUTO: 11.67 K/UL — HIGH (ref 4.8–10.8)

## 2024-12-25 PROCEDURE — 99232 SBSQ HOSP IP/OBS MODERATE 35: CPT

## 2024-12-25 RX ORDER — PIOGLITAZONE HCL 30 MG
1 TABLET ORAL
Qty: 30 | Refills: 2
Start: 2024-12-25 | End: 2025-03-24

## 2024-12-25 RX ORDER — CEFPODOXIME PROXETIL 100 MG/5ML
1 GRANULE, FOR SUSPENSION ORAL
Qty: 10 | Refills: 0
Start: 2024-12-25 | End: 2024-12-29

## 2024-12-25 RX ORDER — ISOPROPYL ALCOHOL, BENZOCAINE .7; .06 ML/ML; ML/ML
0 SWAB TOPICAL
Qty: 100 | Refills: 1
Start: 2024-12-25

## 2024-12-25 RX ORDER — INSULIN LISPRO 100/ML
13 VIAL (ML) SUBCUTANEOUS
Refills: 0 | Status: DISCONTINUED | OUTPATIENT
Start: 2024-12-25 | End: 2024-12-26

## 2024-12-25 RX ORDER — CIPROFLOXACIN HYDROCHLORIDE 500 MG/1
1 TABLET, FILM COATED ORAL
Qty: 16 | Refills: 0
Start: 2024-12-25 | End: 2025-01-01

## 2024-12-25 RX ORDER — METRONIDAZOLE 250 MG/1
1 TABLET ORAL
Qty: 24 | Refills: 0
Start: 2024-12-25 | End: 2025-01-01

## 2024-12-25 RX ORDER — EMPAGLIFLOZIN AND LINAGLIPTIN 10; 5 MG/1; MG/1
1 TABLET, FILM COATED ORAL
Qty: 30 | Refills: 2
Start: 2024-12-25 | End: 2025-03-24

## 2024-12-25 RX ADMIN — METRONIDAZOLE 100 MILLIGRAM(S): 250 TABLET ORAL at 06:23

## 2024-12-25 RX ADMIN — Medication 10 UNIT(S): at 08:21

## 2024-12-25 RX ADMIN — METRONIDAZOLE 100 MILLIGRAM(S): 250 TABLET ORAL at 14:55

## 2024-12-25 RX ADMIN — INSULIN GLARGINE-YFGN 30 UNIT(S): 100 INJECTION, SOLUTION SUBCUTANEOUS at 21:27

## 2024-12-25 RX ADMIN — Medication 10 UNIT(S): at 12:17

## 2024-12-25 RX ADMIN — Medication 13 UNIT(S): at 17:25

## 2024-12-25 RX ADMIN — METRONIDAZOLE 100 MILLIGRAM(S): 250 TABLET ORAL at 21:27

## 2024-12-25 RX ADMIN — Medication 8: at 12:17

## 2024-12-25 RX ADMIN — ENOXAPARIN SODIUM 40 MILLIGRAM(S): 60 INJECTION INTRAVENOUS; SUBCUTANEOUS at 04:16

## 2024-12-25 RX ADMIN — CEFTRIAXONE SODIUM 100 MILLIGRAM(S): 1 INJECTION, POWDER, FOR SOLUTION INTRAMUSCULAR; INTRAVENOUS at 04:15

## 2024-12-25 RX ADMIN — Medication 6: at 08:21

## 2024-12-25 NOTE — DISCHARGE NOTE PROVIDER - NSDCCPCAREPLAN_GEN_ALL_CORE_FT
PRINCIPAL DISCHARGE DIAGNOSIS  Diagnosis: DKA (diabetic ketoacidosis)  Assessment and Plan of Treatment: Diabetic ketoacidosis (DKA) happens when the body does not have enough insulin and can't use the sugar it needs for energy. When the body can't use sugar for energy, it starts to use fat for energy. This process makes fatty acids called ketones. The ketones build up in the blood and change the chemical balance in your body.  DKA occurs most often in people with type 1 diabetes. But people with type 2 diabetes also can get it. DKA can be caused by many things. It can happen if you don't take enough insulin. It can also happen if you have an infection or illness like influenza (flu). Sometimes it happens if you are very dehydrated.  DKA can only be treated in a hospital with insulin and fluids.  Please follow up with your primary care doctor and endocrinologist as soon as possible for furhter recommendations.     PRINCIPAL DISCHARGE DIAGNOSIS  Diagnosis: DKA (diabetic ketoacidosis)  Assessment and Plan of Treatment: Diabetic ketoacidosis (DKA) happens when the body does not have enough insulin and can't use the sugar it needs for energy. When the body can't use sugar for energy, it starts to use fat for energy. This process makes fatty acids called ketones. The ketones build up in the blood and change the chemical balance in your body.  DKA occurs most often in people with type 1 diabetes. But people with type 2 diabetes also can get it. DKA can be caused by many things. It can happen if you don't take enough insulin. It can also happen if you have an infection or illness like influenza (flu). Sometimes it happens if you are very dehydrated.  DKA can only be treated in a hospital with insulin and fluids.  Please follow up with your primary care doctor and endocrinologist as soon as possible for furhter recommendations.      SECONDARY DISCHARGE DIAGNOSES  Diagnosis: Liver lesion  Assessment and Plan of Treatment: outpt MRI and GI outpt

## 2024-12-25 NOTE — DISCHARGE NOTE PROVIDER - NSDCMRMEDTOKEN_GEN_ALL_CORE_FT
amoxicillin-clavulanate 875 mg-125 mg oral tablet: 1 tab(s) orally 2 times a day  metFORMIN 500 mg oral tablet: 1 tab(s) orally 2 times a day   alcohol swabs: Apply topically to affected area 4 times a day  cefpodoxime 200 mg oral tablet: 1 tab(s) orally 2 times a day  glucometer (per patient&#x27;s insurance): Test blood sugars four times a day. Dispense #1 glucometer.  Glyxambi 25 mg-5 mg oral tablet: 1 tab(s) orally once a day  lancets: 1 application subcutaneously 4 times a day  metFORMIN 500 mg oral tablet: 1 tab(s) orally 2 times a day  metroNIDAZOLE 500 mg oral tablet: 1 tab(s) orally 3 times a day  pioglitazone 45 mg oral tablet: 1 tab(s) orally once a day  test strips (per patient&#x27;s insurance): 1 application subcutaneously 4 times a day. ** Compatible with patient&#x27;s glucometer **   alcohol swabs: Apply topically to affected area 4 times a day  ciprofloxacin 500 mg oral tablet: 1 tab(s) orally 2 times a day  glucometer (per patient&#x27;s insurance): Test blood sugars four times a day. Dispense #1 glucometer.  Glyxambi 25 mg-5 mg oral tablet: 1 tab(s) orally once a day  lancets: 1 application subcutaneously 4 times a day  metFORMIN 500 mg oral tablet: 1 tab(s) orally 2 times a day  metroNIDAZOLE 500 mg oral tablet: 1 tab(s) orally 3 times a day  pioglitazone 45 mg oral tablet: 1 tab(s) orally once a day  test strips (per patient&#x27;s insurance): 1 application subcutaneously 4 times a day. ** Compatible with patient&#x27;s glucometer **   alcohol swabs: Apply topically to affected area 4 times a day.  ciprofloxacin 500 mg oral tablet: 1 tab(s) orally 2 times a day  glucometer (per patient&#x27;s insurance): Test blood sugars four times a day. Dispense #1 glucometer.  Glyxambi 25 mg-5 mg oral tablet: 1 tab(s) orally once a day  lancets: 1 application subcutaneously 4 times a day.  metFORMIN 500 mg oral tablet: 1 tab(s) orally 2 times a day  metroNIDAZOLE 500 mg oral tablet: 1 tab(s) orally 3 times a day  pioglitazone 45 mg oral tablet: 1 tab(s) orally once a day  test strips (per patient&#x27;s insurance): 1 application subcutaneously 4 times a day. ** Compatible with patient&#x27;s glucometer **.

## 2024-12-25 NOTE — DISCHARGE NOTE PROVIDER - NSDCFUADDAPPT_GEN_ALL_CORE_FT
APPTS ARE READY TO BE MADE: [ ] YES    Best Family or Patient Contact (if needed):    Additional Information about above appointments (if needed):    1: Savanna Isaacs  2:   3:     Other comments or requests:    APPTS ARE READY TO BE MADE: [ ] YES    Best Family or Patient Contact (if needed):    Additional Information about above appointments (if needed):    1: Savanna Isaacs  2: Letty Do  3:     Other comments or requests:

## 2024-12-25 NOTE — DISCHARGE NOTE PROVIDER - HOSPITAL COURSE
45yo M no known PMH (has not seen a PCP in years) coming to ED for abdominal pain and diarrhea.  Patient reports x 2 weeks of generalized upper abdominal discomfort, reports 3-5 episodes of nonbloody diarrhea daily, associated nausea no vomiting. Worsens w/ food.  Of note patient was seen in ED 2 days ago for same symptoms, was found to have colitis but reported feeling better after medications in ED, has been taking antibiotics as prescribed.  Returning to ED because abdominal discomfort and diarrhea returned.  Denies fever, vomiting, constipation, urinary symptoms, testicular pain, rash, recent travel, chest pain, shortness of breath, abdominal surgeries.     In the ED,   Vitals: /85, HR 98, RR 18, T 98.2, satting 98% on RA   Labs: WBC 13.35, Hgb 16.2, , Na 130, K 4.4, AG 17, BHB 4.3     Found to be in DKA. Started on insulin drip given IVF and admitted to ICU     Patient seen by endocrinology: obesity, stress induced worsening of type 2 diabetes, no evidence for diabetic ketoacidosis, monitor glucose levels, insulin PRN, in the long run, needs strict calorie control to help promote weight loss.     Patient's anion gap closed.     As per endocrine: patient to be discharged on metformin 500 mg. twice daily after meals  which he already has, pioglitazone 45 mg. daily, AND GLYXAMBI 25/5 ONE TABLET DAILY,     Patient stable for discharge, approved by Dr. Torres.

## 2024-12-25 NOTE — DISCHARGE NOTE PROVIDER - NSDCFUSCHEDAPPT_GEN_ALL_CORE_FT
Todd Han  [facility] PreAdmits  Scheduled Appointment: 12/26/2024    [facility]  [address]  Scheduled Appointment: 12/26/2024

## 2024-12-25 NOTE — DISCHARGE NOTE PROVIDER - ATTENDING DISCHARGE PHYSICAL EXAMINATION:
Attending attestation  Attending DC note  Pt seen and examined at bedside. No cp or sob. feeling better. primary care physician follow up   vitals, labs, exam stable  Hospital course as above.  Plan dw pt and agreed to plan  Medically cleared for DC. Med recc completed.  YADIEL resident. Spent 32 mins on case

## 2024-12-25 NOTE — DISCHARGE NOTE PROVIDER - NSDCFUADDINST_GEN_ALL_CORE_FT
Please follow with Dr. Pride and Dr Greenwood for your appointments.   Please repeat your blood work to make sure your white blood cell count is decreasing.

## 2024-12-25 NOTE — DISCHARGE NOTE PROVIDER - PROVIDER TOKENS
PROVIDER:[TOKEN:[[id number]],FOLLOWUP:[1 month]] PROVIDER:[TOKEN:[[id number]],FOLLOWUP:[1 month]],PROVIDER:[TOKEN:[[id number]],FOLLOWUP:[1 week]]

## 2024-12-25 NOTE — DISCHARGE NOTE PROVIDER - CARE PROVIDER_API CALL
Savanna Isaacs  Endocrinology/Metab/Diabetes  [address] [place] [address]  Phone: [phone]  Fax: [phone]  Follow Up Time: 1 month   Savanna Isaacs  Endocrinology/Metab/Diabetes  [address] [place] [address]  Phone: [phone]  Fax: [phone]  Follow Up Time: 1 month    Letty Do  Gastroenterology  [address] [place] [address]  Phone: [phone]  Fax: [phone]  Follow Up Time: 1 week

## 2024-12-25 NOTE — DISCHARGE NOTE PROVIDER - CARE PROVIDERS DIRECT ADDRESSES
[email or web address]

## 2024-12-25 NOTE — PATIENT PROFILE ADULT - FALL HARM RISK - UNIVERSAL INTERVENTIONS
Bed in lowest position, wheels locked, appropriate side rails in place/Call bell, personal items and telephone in reach/Instruct patient to call for assistance before getting out of bed or chair/Non-slip footwear when patient is out of bed/[place] to call system/Physically safe environment - no spills, clutter or unnecessary equipment/Purposeful Proactive Rounding/Room/bathroom lighting operational, light cord in reach

## 2024-12-26 ENCOUNTER — APPOINTMENT (OUTPATIENT)
Dept: INTERNAL MEDICINE | Facility: CLINIC | Age: 44
End: 2024-12-26

## 2024-12-26 ENCOUNTER — TRANSCRIPTION ENCOUNTER (OUTPATIENT)
Age: 44
End: 2024-12-26

## 2024-12-26 ENCOUNTER — OUTPATIENT (OUTPATIENT)
Dept: OUTPATIENT SERVICES | Facility: HOSPITAL | Age: 44
LOS: 1 days | End: 2024-12-26
Payer: MEDICAID

## 2024-12-26 VITALS
DIASTOLIC BLOOD PRESSURE: 75 MMHG | RESPIRATION RATE: 18 BRPM | OXYGEN SATURATION: 98 % | SYSTOLIC BLOOD PRESSURE: 107 MMHG | TEMPERATURE: 98 F | HEART RATE: 68 BPM

## 2024-12-26 VITALS
HEIGHT: 66 IN | SYSTOLIC BLOOD PRESSURE: 125 MMHG | OXYGEN SATURATION: 100 % | TEMPERATURE: 97.2 F | DIASTOLIC BLOOD PRESSURE: 87 MMHG | WEIGHT: 173.31 LBS | HEART RATE: 98 BPM | BODY MASS INDEX: 27.85 KG/M2

## 2024-12-26 DIAGNOSIS — E11.9 TYPE 2 DIABETES MELLITUS W/OUT COMPLICATIONS: ICD-10-CM

## 2024-12-26 DIAGNOSIS — K76.89 OTHER SPECIFIED DISEASES OF LIVER: ICD-10-CM

## 2024-12-26 DIAGNOSIS — K76.9 LIVER DISEASE, UNSPECIFIED: ICD-10-CM

## 2024-12-26 DIAGNOSIS — Z00.00 ENCOUNTER FOR GENERAL ADULT MEDICAL EXAMINATION WITHOUT ABNORMAL FINDINGS: ICD-10-CM

## 2024-12-26 DIAGNOSIS — Z00.00 ENCOUNTER FOR GENERAL ADULT MEDICAL EXAMINATION W/OUT ABNORMAL FINDINGS: ICD-10-CM

## 2024-12-26 LAB
ALBUMIN SERPL ELPH-MCNC: 4.1 G/DL — SIGNIFICANT CHANGE UP (ref 3.5–5.2)
ALP SERPL-CCNC: 159 U/L — HIGH (ref 30–115)
ALT FLD-CCNC: 22 U/L — SIGNIFICANT CHANGE UP (ref 0–41)
ANION GAP SERPL CALC-SCNC: 12 MMOL/L — SIGNIFICANT CHANGE UP (ref 7–14)
AST SERPL-CCNC: 29 U/L — SIGNIFICANT CHANGE UP (ref 0–41)
BASOPHILS # BLD AUTO: 0.08 K/UL — SIGNIFICANT CHANGE UP (ref 0–0.2)
BASOPHILS NFR BLD AUTO: 0.7 % — SIGNIFICANT CHANGE UP (ref 0–1)
BILIRUB SERPL-MCNC: 0.2 MG/DL — SIGNIFICANT CHANGE UP (ref 0.2–1.2)
BUN SERPL-MCNC: 11 MG/DL — SIGNIFICANT CHANGE UP (ref 10–20)
CALCIUM SERPL-MCNC: 9.4 MG/DL — SIGNIFICANT CHANGE UP (ref 8.4–10.5)
CHLORIDE SERPL-SCNC: 98 MMOL/L — SIGNIFICANT CHANGE UP (ref 98–110)
CO2 SERPL-SCNC: 26 MMOL/L — SIGNIFICANT CHANGE UP (ref 17–32)
CREAT SERPL-MCNC: 0.9 MG/DL — SIGNIFICANT CHANGE UP (ref 0.7–1.5)
CULTURE RESULTS: SIGNIFICANT CHANGE UP
EGFR: 108 ML/MIN/1.73M2 — SIGNIFICANT CHANGE UP
EOSINOPHIL # BLD AUTO: 5.02 K/UL — HIGH (ref 0–0.7)
EOSINOPHIL NFR BLD AUTO: 41.2 % — HIGH (ref 0–8)
GLUCOSE BLDC GLUCOMTR-MCNC: 227 MG/DL — HIGH (ref 70–99)
GLUCOSE BLDC GLUCOMTR-MCNC: 244 MG/DL — HIGH (ref 70–99)
GLUCOSE SERPL-MCNC: 239 MG/DL — HIGH (ref 70–99)
HCT VFR BLD CALC: 43.6 % — SIGNIFICANT CHANGE UP (ref 42–52)
HGB BLD-MCNC: 15.6 G/DL — SIGNIFICANT CHANGE UP (ref 14–18)
IMM GRANULOCYTES NFR BLD AUTO: 1.5 % — HIGH (ref 0.1–0.3)
LYMPHOCYTES # BLD AUTO: 1.94 K/UL — SIGNIFICANT CHANGE UP (ref 1.2–3.4)
LYMPHOCYTES # BLD AUTO: 15.9 % — LOW (ref 20.5–51.1)
MAGNESIUM SERPL-MCNC: 1.9 MG/DL — SIGNIFICANT CHANGE UP (ref 1.8–2.4)
MCHC RBC-ENTMCNC: 31.1 PG — HIGH (ref 27–31)
MCHC RBC-ENTMCNC: 35.8 G/DL — SIGNIFICANT CHANGE UP (ref 32–37)
MCV RBC AUTO: 87 FL — SIGNIFICANT CHANGE UP (ref 80–94)
MONOCYTES # BLD AUTO: 0.46 K/UL — SIGNIFICANT CHANGE UP (ref 0.1–0.6)
MONOCYTES NFR BLD AUTO: 3.8 % — SIGNIFICANT CHANGE UP (ref 1.7–9.3)
NEUTROPHILS # BLD AUTO: 4.51 K/UL — SIGNIFICANT CHANGE UP (ref 1.4–6.5)
NEUTROPHILS NFR BLD AUTO: 36.9 % — LOW (ref 42.2–75.2)
NRBC # BLD: 0 /100 WBCS — SIGNIFICANT CHANGE UP (ref 0–0)
PLATELET # BLD AUTO: 213 K/UL — SIGNIFICANT CHANGE UP (ref 130–400)
PMV BLD: 11.7 FL — HIGH (ref 7.4–10.4)
POTASSIUM SERPL-MCNC: 3.5 MMOL/L — SIGNIFICANT CHANGE UP (ref 3.5–5)
POTASSIUM SERPL-SCNC: 3.5 MMOL/L — SIGNIFICANT CHANGE UP (ref 3.5–5)
PROT SERPL-MCNC: 6 G/DL — SIGNIFICANT CHANGE UP (ref 6–8)
RBC # BLD: 5.01 M/UL — SIGNIFICANT CHANGE UP (ref 4.7–6.1)
RBC # FLD: 12 % — SIGNIFICANT CHANGE UP (ref 11.5–14.5)
SODIUM SERPL-SCNC: 136 MMOL/L — SIGNIFICANT CHANGE UP (ref 135–146)
SPECIMEN SOURCE: SIGNIFICANT CHANGE UP
WBC # BLD: 12.19 K/UL — HIGH (ref 4.8–10.8)
WBC # FLD AUTO: 12.19 K/UL — HIGH (ref 4.8–10.8)

## 2024-12-26 PROCEDURE — 99239 HOSP IP/OBS DSCHRG MGMT >30: CPT

## 2024-12-26 PROCEDURE — 99386 PREV VISIT NEW AGE 40-64: CPT

## 2024-12-26 PROCEDURE — 99396 PREV VISIT EST AGE 40-64: CPT

## 2024-12-26 RX ORDER — ISOPROPYL ALCOHOL, BENZOCAINE .7; .06 ML/ML; ML/ML
0 SWAB TOPICAL
Qty: 100 | Refills: 1
Start: 2024-12-26

## 2024-12-26 RX ORDER — EMPAGLIFLOZIN AND LINAGLIPTIN 25; 5 MG/1; MG/1
25-5 TABLET, FILM COATED ORAL
Refills: 0 | Status: ACTIVE | COMMUNITY

## 2024-12-26 RX ORDER — PIOGLITAZONE HYDROCHLORIDE 45 MG/1
TABLET ORAL
Refills: 0 | Status: ACTIVE | COMMUNITY

## 2024-12-26 RX ADMIN — ENOXAPARIN SODIUM 40 MILLIGRAM(S): 60 INJECTION INTRAVENOUS; SUBCUTANEOUS at 04:45

## 2024-12-26 RX ADMIN — Medication 4: at 07:57

## 2024-12-26 RX ADMIN — CEFTRIAXONE SODIUM 100 MILLIGRAM(S): 1 INJECTION, POWDER, FOR SOLUTION INTRAMUSCULAR; INTRAVENOUS at 04:44

## 2024-12-26 RX ADMIN — Medication 13 UNIT(S): at 07:58

## 2024-12-26 RX ADMIN — Medication 4: at 12:09

## 2024-12-26 RX ADMIN — METRONIDAZOLE 100 MILLIGRAM(S): 250 TABLET ORAL at 06:35

## 2024-12-26 RX ADMIN — Medication 13 UNIT(S): at 12:10

## 2024-12-26 NOTE — DISCHARGE NOTE NURSING/CASE MANAGEMENT/SOCIAL WORK - FINANCIAL ASSISTANCE
[facility] provides services at a reduced cost to those who are determined to be eligible through [facility]’s financial assistance program. Information regarding [facility]’s financial assistance program can be found by going to [email or web address] or by calling [phone].

## 2024-12-26 NOTE — PROGRESS NOTE ADULT - REASON FOR ADMISSION
Patient informed she will have to pay out of pocket for Colace at this time due to insurance not covering. Patient would like to have another pain medication for back pain, she states the tramadol is helping, she will contact pharmacy for refill at this time .
DKA and Colitis

## 2024-12-26 NOTE — DISCHARGE NOTE NURSING/CASE MANAGEMENT/SOCIAL WORK - PATIENT PORTAL LINK FT
You can access the FollowMyHealth Patient Portal offered by [facility] by registering at the following website: [email or web address]. By joining [facility]’s FollowMyHealth portal, you will also be able to view your health information using other applications (apps) compatible with our system.

## 2024-12-26 NOTE — PROGRESS NOTE ADULT - ASSESSMENT
type 2 diabetes, pharmacy [place] [address]  [address], can go home on metformin 500 mg. twice daily after meals  which he already has, pioglitazone 45 mg. daily, AND GLYXAMBI 25/5 ONE TABLET DAILY, THE LATTER IS A COMBO OF EMPAGLIFLOZIN, AND linagliptin, also needs contour next ez meter contour next test strips tests once a day, and lancets tests once a day, medicaid and medicare allow once a day testing if not taking insulin. he can follow up in diabetes clinic extn. [phone] no referrals needed, he's is seeing a primary care for the first time tomorrow.
45yo M no known PMH (has not seen a PCP in years) coming to ED for abdominal pain and diarrhea.  Found to be in DKA     #DKA with Hyperglycemia   - Admitted to ICU on insuilin ggt  - new on set DM a1c 10.8  - likley 2/2 colitis, on ctx and flagyl, gI pcr neg, resolving  - now on isulin subq, AG closed   - Insulin glargine 30 un and increased to lispro 13 TID , SS  - pt BG in 300s adjusted Lispro   - endocrine consult appreciated, recc oral meds on DC, recc DC 12/26 and monitor FSBG, for better control     #Abd pain 2/2 Acute colitis- loose stool now, x1 in am, GI pcr neg, CTX and flagyl, DC on cipro and flagyl x 10 days, outpt GI     #Liver lesion   -Liver segment II/III enhancing lesion of the 3 cm  - outpt MRI and GI consult    #Progress Note Handoff  Pending (specify):  monitor BS  Family discussion: house staff updated pt family  Disposition: home, CM set up home services, meds confirmed filled at [facility] pharmacy, Has  primary care physician appt, please send referral for GI and endo outpt  Decision to admit the pt is based on acuity as above       
45yo M no known PMH (has not seen a PCP in years) coming to ED for abdominal pain and diarrhea.  Patient reports x 2 weeks of generalized upper abdominal discomfort, reports 3-5 episodes of nonbloody diarrhea daily, associated nausea no vomiting. Worsens w/ food.  Of note patient was seen in ED 2 days ago for same symptoms, was found to have colitis but reported feeling better after medications in ED, has been taking antibiotics as prescribed.  Returning to ED because abdominal discomfort and diarrhea returned.  Denies fever, vomiting, constipation, urinary symptoms, testicular pain, rash, recent travel, chest pain, shortness of breath, abdominal surgeries.     In the ED,   Vitals: /85, HR 98, RR 18, T 98.2, satting 98% on RA   Labs: WBC 13.35, Hgb 16.2, , Na 130, K 4.4, AG 17, BHB 4.3   Found to be in DKA. Started on insulin drip given IVF and admitted to ICU   Patient sen by endocrinology: obesity, stress induced worsening of type 2 diabetes, no evidence for diabetic ketoacidosis, monitor glucose levels, insulin PRN, in the long run, needs strict calorie control to help promote weight loss.   Patient's anion gap closed.   As per endocrine: patient to be discharged on metformin 500 mg. twice daily after meals  which he already has, pioglitazone 45 mg. daily, AND GLYXAMBI 25/5 ONE TABLET DAILY,   Patient stable for discharge, approved by Dr. Torres.     #DKA with Hyperglycemia   - Admitted to ICU on insuilin ggt  - new on set DM a1c 10.8  - likley 2/2 colitis, on ctx and flagyl, gI pcr neg, resolving  - now on isulin subq, AG closed   - Insulin glargine 30 un and increased to lispro 13 TID , SS  - pt BG in 300s adjusted Lispro   - endocrine consult appreciated, recc oral meds on DC, recc DC 12/26 and monitor FSBG, for better control     #Abd pain 2/2 Acute colitis- loose stool now, x1 in am, GI pcr neg, CTX and flagyl, DC on cipro and flagyl x 10 days, outpt GI     #Liver lesion   -Liver segment II/III enhancing lesion of the 3 cm  - outpt MRI and GI consult

## 2024-12-26 NOTE — PROGRESS NOTE ADULT - SUBJECTIVE AND OBJECTIVE BOX
24H events:    Patient is a 44y old Male who presents with a chief complaint of DKA and Colitis (25 Dec 2024 14:08)    Primary diagnosis of DKA (diabetic ketoacidosis)       Today is hospital day 3d.      PAST MEDICAL & SURGICAL HISTORY  No pertinent past medical history    No significant past surgical history      SOCIAL HISTORY:  Negative for smoking/alcohol/drug use.     ALLERGIES:  No Known Allergies    MEDICATIONS:  STANDING MEDICATIONS  cefTRIAXone   IVPB 1000 milliGRAM(s) IV Intermittent every 24 hours  dextrose 5%. 1000 milliLiter(s) IV Continuous <Continuous>  dextrose 5%. 1000 milliLiter(s) IV Continuous <Continuous>  dextrose 50% Injectable 25 Gram(s) IV Push once  dextrose 50% Injectable 12.5 Gram(s) IV Push once  dextrose 50% Injectable 25 Gram(s) IV Push once  enoxaparin Injectable 40 milliGRAM(s) SubCutaneous every 24 hours  glucagon  Injectable 1 milliGRAM(s) IntraMuscular once  insulin glargine Injectable (LANTUS) 30 Unit(s) SubCutaneous at bedtime  insulin lispro (ADMELOG) corrective regimen sliding scale   SubCutaneous three times a day before meals  insulin lispro Injectable (ADMELOG) 13 Unit(s) SubCutaneous three times a day before meals  lactated ringers 1000 milliLiter(s) IV Continuous <Continuous>  metroNIDAZOLE  IVPB 500 milliGRAM(s) IV Intermittent every 8 hours  metroNIDAZOLE  IVPB        PRN MEDICATIONS  acetaminophen     Tablet .. 650 milliGRAM(s) Oral every 6 hours PRN  aluminum hydroxide/magnesium hydroxide/simethicone Suspension 30 milliLiter(s) Oral every 4 hours PRN  dextrose Oral Gel 15 Gram(s) Oral once PRN  melatonin 3 milliGRAM(s) Oral at bedtime PRN  ondansetron Injectable 4 milliGRAM(s) IV Push every 8 hours PRN    VITALS:   T(F): 97.7  HR: 68  BP: 107/75  RR: 18  SpO2: 98%    LABS:                        15.6   12.19 )-----------( 213      ( 26 Dec 2024 05:48 )             43.6     12-26    136  |  98  |  11  ----------------------------<  239[H]  3.5   |  26  |  0.9    Ca    9.4      26 Dec 2024 05:48  Mg     1.9     12-26    TPro  6.0  /  Alb  4.1  /  TBili  0.2  /  DBili  x   /  AST  29  /  ALT  22  /  AlkPhos  159[H]  12-26      Urinalysis Basic - ( 26 Dec 2024 05:48 )    Color: x / Appearance: x / SG: x / pH: x  Gluc: 239 mg/dL / Ketone: x  / Bili: x / Urobili: x   Blood: x / Protein: x / Nitrite: x   Leuk Esterase: x / RBC: x / WBC x   Sq Epi: x / Non Sq Epi: x / Bacteria: x            Culture - Stool (collected 24 Dec 2024 10:30)  Source: .Stool  Preliminary Report (25 Dec 2024 21:58):    No enteric pathogens to date: Final culture pending          RADIOLOGY:    PHYSICAL EXAM:  GENERAL: NAD  NECK: Supple  NERVOUS SYSTEM:  Alert & Oriented X3  CHEST/LUNG: Clear to auscultation bilaterally  HEART: Regular rate and rhythm  ABDOMEN: Soft, Nontender  EXTREMITIES:  + Peripheral Pulses      
Patient is a 44y old  Male who presents with a chief complaint of DKA and Colitis (12-25-24)      Pt seen and examined at bedside. No CP or SOB.          PAST MEDICAL & SURGICAL HISTORY:  No pertinent past medical history    No significant past surgical history        VITAL SIGNS (Last 24 hrs):  T(C): 36.3 (12-25-24 @ 07:44), Max: 36.6 (12-24-24 @ 15:44)  HR: 77 (12-25-24 @ 07:44) (74 - 85)  BP: 120/97 (12-25-24 @ 07:44) (106/70 - 120/97)  RR: 18 (12-25-24 @ 07:44) (18 - 18)  SpO2: 99% (12-25-24 @ 07:44) (97% - 99%)  Wt(kg): --  Daily     Daily     I&O's Summary      PHYSICAL EXAM:  GENERAL: NAD, well-developed  HEAD:  Atraumatic, Normocephalic  EYES: EOMI, PERRLA, conjunctiva and sclera clear  NECK: Supple, No JVD  CHEST/LUNG: Clear to auscultation bilaterally; No wheeze  HEART: Regular rate and rhythm; No murmurs, rubs, or gallops  ABDOMEN: Soft, Nontender, Nondistended; Bowel sounds present  EXTREMITIES:  2+ Peripheral Pulses, No clubbing, cyanosis, or edema  PSYCH: AAOx3  NEUROLOGY: non-focal  SKIN: No rashes or lesions    Labs Reviewed  Spoke to patient in regards to abnormal labs.    CBC Full  -  ( 25 Dec 2024 11:30 )  WBC Count : 11.67 K/uL  Hemoglobin : 16.1 g/dL  Hematocrit : 45.5 %  Platelet Count - Automated : 223 K/uL  Mean Cell Volume : 88.7 fL  Mean Cell Hemoglobin : 31.4 pg  Mean Cell Hemoglobin Concentration : 35.4 g/dL  Auto Neutrophil # : 5.28 K/uL  Auto Lymphocyte # : 1.52 K/uL  Auto Monocyte # : 0.46 K/uL  Auto Eosinophil # : 4.18 K/uL  Auto Basophil # : 0.08 K/uL  Auto Neutrophil % : 45.3 %  Auto Lymphocyte % : 13.0 %  Auto Monocyte % : 3.9 %  Auto Eosinophil % : 35.8 %  Auto Basophil % : 0.7 %    BMP:    12-25 @ 11:30    Blood Urea Nitrogen - 11  Calcium - 9.6  Carbond Dioxide - 25  Chloride - 93  Creatinine - 0.9  Glucose - 314  Potassium - 4.4  Sodium - 132      Hemoglobin A1c -     Urine Culture:        COVID Labs  CRP:      D-Dimer:            Imaging reviewed independently and reviewed read    < from: CT Abdomen and Pelvis w/ IV Cont (12.20.24 @ 21:08) >  IMPRESSION:  1.  Acute colitis extending from the hepatic flexure through the proximal   left colon which could be of infectious or inflammatory etiology. No   bowel obstruction. Mild adjacent reactive mesenteric lymph nodes.  2.  Liver segment II/III enhancing lesion of the 3 cm, not fully   characterized on single phase study. Liver protocol CT or MRI without and   with contrast suggested on outpatient basis for further evaluation.    < end of copied text >      MEDICATIONS  (STANDING):  cefTRIAXone   IVPB 1000 milliGRAM(s) IV Intermittent every 24 hours  dextrose 5%. 1000 milliLiter(s) (50 mL/Hr) IV Continuous <Continuous>  dextrose 5%. 1000 milliLiter(s) (100 mL/Hr) IV Continuous <Continuous>  dextrose 50% Injectable 25 Gram(s) IV Push once  dextrose 50% Injectable 12.5 Gram(s) IV Push once  dextrose 50% Injectable 25 Gram(s) IV Push once  enoxaparin Injectable 40 milliGRAM(s) SubCutaneous every 24 hours  glucagon  Injectable 1 milliGRAM(s) IntraMuscular once  insulin glargine Injectable (LANTUS) 30 Unit(s) SubCutaneous at bedtime  insulin lispro (ADMELOG) corrective regimen sliding scale   SubCutaneous three times a day before meals  insulin lispro Injectable (ADMELOG) 13 Unit(s) SubCutaneous three times a day before meals  lactated ringers 1000 milliLiter(s) (100 mL/Hr) IV Continuous <Continuous>  metroNIDAZOLE  IVPB 500 milliGRAM(s) IV Intermittent every 8 hours  metroNIDAZOLE  IVPB        MEDICATIONS  (PRN):  acetaminophen     Tablet .. 650 milliGRAM(s) Oral every 6 hours PRN Temp greater or equal to 38C (100.4F), Mild Pain (1 - 3)  aluminum hydroxide/magnesium hydroxide/simethicone Suspension 30 milliLiter(s) Oral every 4 hours PRN Dyspepsia  dextrose Oral Gel 15 Gram(s) Oral once PRN Blood Glucose LESS THAN 70 milliGRAM(s)/deciliter  melatonin 3 milliGRAM(s) Oral at bedtime PRN Insomnia  ondansetron Injectable 4 milliGRAM(s) IV Push every 8 hours PRN Nausea and/or Vomiting      
Reason for Endocrinology Consult: Diabetes    HPI: 44y Male      PAST MEDICAL & SURGICAL HISTORY:  No pertinent past medical history      No significant past surgical history        FAMILY HISTORY:  No pertinent family history in first degree relatives        SH:  Smoking  Etoh:  Recreational Drugs:    Home Medications:      Current (Non-Endocrine) Meds:  acetaminophen     Tablet .. 650 milliGRAM(s) Oral every 6 hours PRN  aluminum hydroxide/magnesium hydroxide/simethicone Suspension 30 milliLiter(s) Oral every 4 hours PRN  cefTRIAXone   IVPB 1000 milliGRAM(s) IV Intermittent every 24 hours  dextrose 5%. 1000 milliLiter(s) IV Continuous <Continuous>  dextrose 5%. 1000 milliLiter(s) IV Continuous <Continuous>  enoxaparin Injectable 40 milliGRAM(s) SubCutaneous every 24 hours  lactated ringers 1000 milliLiter(s) IV Continuous <Continuous>  melatonin 3 milliGRAM(s) Oral at bedtime PRN  metroNIDAZOLE  IVPB 500 milliGRAM(s) IV Intermittent every 8 hours  metroNIDAZOLE  IVPB      ondansetron Injectable 4 milliGRAM(s) IV Push every 8 hours PRN      Current Endocrine Meds:   dextrose 50% Injectable 25 Gram(s) IV Push once  dextrose 50% Injectable 12.5 Gram(s) IV Push once  dextrose 50% Injectable 25 Gram(s) IV Push once  dextrose Oral Gel 15 Gram(s) Oral once PRN  glucagon  Injectable 1 milliGRAM(s) IntraMuscular once  insulin glargine Injectable (LANTUS) 30 Unit(s) SubCutaneous at bedtime  insulin lispro (ADMELOG) corrective regimen sliding scale   SubCutaneous three times a day before meals  insulin lispro Injectable (ADMELOG) 10 Unit(s) SubCutaneous three times a day before meals      Allergies:  No Known Allergies      ROS:  Denies the following except as indicated.    General: weight loss/weight gain, decreased appetite, fatigue, fever  Eyes: blurry vision, double vision  ENT: neck swelling, dysphagia, voice changes   CV: palpitations, SOB, chest pain, cough  GI: nausea, vomiting, diarrhea, constipation, abdominal pain  : nocturia,  polyuria, dysuria  Endo: decreased libido, heat/cold intolerance, jitteriness  MSK: arthralgias, myalgias  Skin: rash, dryness, diaphoresis  Neuro: pedal numbness,pedal paresthesias, pedal pain      Weight (kg): 80.3 (12-23 @ 12:22), 88.5 (12-20 @ 17:58)    Vital Signs Last 24 Hrs  T(C): 36.3 (25 Dec 2024 07:44), Max: 36.6 (24 Dec 2024 15:44)  T(F): 97.4 (25 Dec 2024 07:44), Max: 97.8 (24 Dec 2024 15:44)  HR: 77 (25 Dec 2024 07:44) (74 - 85)  BP: 120/97 (25 Dec 2024 07:44) (106/70 - 120/97)  BP(mean): 88 (24 Dec 2024 15:44) (88 - 88)  RR: 18 (25 Dec 2024 07:44) (18 - 18)  SpO2: 99% (25 Dec 2024 07:44) (97% - 99%)    Parameters below as of 25 Dec 2024 07:44  Patient On (Oxygen Delivery Method): room air      Constitutional: WN/WD in NAD.   Neck: no thyromegaly or palpable thyroid nodules   Respiratory: lungs CTAB.  Cardiovascular: regular rate and rhythm, normal S1 and S2, no audible murmurs  GI: soft, NT/ND, no masses/HSM appreciated.  Ext: no edema, no ulcers, pedal pulses palpable bilaterally  Neurology: no tremor, monofilament sensation intact in feet  Psychiatric: A&O x 3, normal affect/mood.        LABS:                        14.3   11.35 )-----------( 202      ( 24 Dec 2024 06:04 )             39.4     12-24    138  |  103  |  9[L]  ----------------------------<  123[H]  3.7   |  23  |  0.6[L]    Ca    8.7      24 Dec 2024 06:04  Mg     1.9     12-24    TPro  5.4[L]  /  Alb  3.5  /  TBili  <0.2  /  DBili  x   /  AST  9   /  ALT  12  /  AlkPhos  149[H]  12-24      Urinalysis Basic - ( 24 Dec 2024 06:04 )    Color: x / Appearance: x / SG: x / pH: x  Gluc: 123 mg/dL / Ketone: x  / Bili: x / Urobili: x   Blood: x / Protein: x / Nitrite: x   Leuk Esterase: x / RBC: x / WBC x   Sq Epi: x / Non Sq Epi: x / Bacteria: x                                     RADIOLOGY & ADDITIONAL STUDIES:    A/P:44yMale

## 2024-12-26 NOTE — DISCHARGE NOTE NURSING/CASE MANAGEMENT/SOCIAL WORK - NSDPACMPNY_GEN_ALL_CORE
Message   Recorded as Task   Date: 04/13/2017 05:01 PM, Created By: HERIBERTO BARCENAS   Task Name: Call Patient with results   Assigned To: HERIBERTO BARCENAS   Regarding Patient: MEI VAZQUEZ, Status: In Progress   Comment:    HERIBERTO BARCENAS - 13 Apr 2017 5:01 PM     Patient Phone: [phone]      Patient's hemoglobin is holding steady around 7.6 but obviously this still is running low.  Continue with the iron supplement twice daily.  I know she is talking with gynecology about treatment plan regarding her dysfunctional uterine bleeding.  Please repeat lab work in one month.  Order has been placed.   Mable Nance - 14 Apr 2017 9:08 AM     TASK IN PROGRESS   Mable Nance - 14 Apr 2017 9:09 AM     TASK EDITED  patient notified, verbalized understanding    will have lab checked in a month    copied to note/juwi        Signatures   Electronically signed by : Mable Nance L.P.N.; Apr 14 2017  9:10AM CST    
Family

## 2024-12-26 NOTE — CHART NOTE - NSCHARTNOTEFT_GEN_A_CORE
will follow up with PCP before scheduling GI, sent to Steffi Rincon 12/23 - [facility]UL[facility] / as per Steffi, pt will call back to schedule 12/26 - DK (PCP & GI Referral)

## 2024-12-26 NOTE — DISCHARGE NOTE NURSING/CASE MANAGEMENT/SOCIAL WORK - NSDCFUADDAPPT_GEN_ALL_CORE_FT
APPTS ARE READY TO BE MADE: [ ] YES    Best Family or Patient Contact (if needed):    Additional Information about above appointments (if needed):    1: Savanna Isaacs  2: Letty Do  3:     Other comments or requests:

## 2024-12-27 DIAGNOSIS — K76.89 OTHER SPECIFIED DISEASES OF LIVER: ICD-10-CM

## 2024-12-27 DIAGNOSIS — K52.9 NONINFECTIVE GASTROENTERITIS AND COLITIS, UNSPECIFIED: ICD-10-CM

## 2024-12-27 DIAGNOSIS — E11.9 TYPE 2 DIABETES MELLITUS WITHOUT COMPLICATIONS: ICD-10-CM

## 2024-12-27 DIAGNOSIS — K76.9 LIVER DISEASE, UNSPECIFIED: ICD-10-CM

## 2024-12-27 DIAGNOSIS — Z00.00 ENCOUNTER FOR GENERAL ADULT MEDICAL EXAMINATION WITHOUT ABNORMAL FINDINGS: ICD-10-CM

## 2024-12-29 ENCOUNTER — OUTPATIENT (OUTPATIENT)
Dept: OUTPATIENT SERVICES | Facility: HOSPITAL | Age: 44
LOS: 1 days | End: 2024-12-29
Payer: MEDICAID

## 2024-12-29 DIAGNOSIS — K76.9 LIVER DISEASE, UNSPECIFIED: ICD-10-CM

## 2024-12-29 PROBLEM — E11.65 CONTROLLED DIABETES MELLITUS WITH HYPERGLYCEMIA: Status: ACTIVE | Noted: 2024-12-29

## 2024-12-29 PROCEDURE — A9579: CPT

## 2024-12-29 PROCEDURE — 74183 MRI ABD W/O CNTR FLWD CNTR: CPT

## 2024-12-29 PROCEDURE — 74183 MRI ABD W/O CNTR FLWD CNTR: CPT | Mod: 26

## 2024-12-29 RX ORDER — METFORMIN HYDROCHLORIDE 500 MG/1
500 TABLET, COATED ORAL
Qty: 180 | Refills: 0 | Status: ACTIVE | COMMUNITY
Start: 1900-01-01 | End: 1900-01-01

## 2024-12-30 DIAGNOSIS — K76.9 LIVER DISEASE, UNSPECIFIED: ICD-10-CM

## 2024-12-30 RX ORDER — BLOOD SUGAR DIAGNOSTIC
STRIP MISCELLANEOUS
Qty: 100 | Refills: 2 | Status: ACTIVE | COMMUNITY
Start: 2024-12-27 | End: 1900-01-01

## 2025-01-02 DIAGNOSIS — K76.9 LIVER DISEASE, UNSPECIFIED: ICD-10-CM

## 2025-01-02 DIAGNOSIS — Z71.3 DIETARY COUNSELING AND SURVEILLANCE: ICD-10-CM

## 2025-01-02 DIAGNOSIS — E11.10 TYPE 2 DIABETES MELLITUS WITH KETOACIDOSIS WITHOUT COMA: ICD-10-CM

## 2025-01-02 DIAGNOSIS — K52.9 NONINFECTIVE GASTROENTERITIS AND COLITIS, UNSPECIFIED: ICD-10-CM

## 2025-01-02 DIAGNOSIS — Z73.3 STRESS, NOT ELSEWHERE CLASSIFIED: ICD-10-CM

## 2025-01-03 ENCOUNTER — APPOINTMENT (OUTPATIENT)
Dept: GASTROENTEROLOGY | Facility: CLINIC | Age: 45
End: 2025-01-03
Payer: MEDICAID

## 2025-01-03 VITALS
SYSTOLIC BLOOD PRESSURE: 117 MMHG | HEART RATE: 66 BPM | DIASTOLIC BLOOD PRESSURE: 77 MMHG | HEIGHT: 66 IN | BODY MASS INDEX: 27.64 KG/M2 | WEIGHT: 172 LBS

## 2025-01-03 DIAGNOSIS — Z87.19 PERSONAL HISTORY OF OTHER DISEASES OF THE DIGESTIVE SYSTEM: ICD-10-CM

## 2025-01-03 DIAGNOSIS — R10.30 LOWER ABDOMINAL PAIN, UNSPECIFIED: ICD-10-CM

## 2025-01-03 DIAGNOSIS — R79.89 OTHER SPECIFIED ABNORMAL FINDINGS OF BLOOD CHEMISTRY: ICD-10-CM

## 2025-01-03 DIAGNOSIS — K52.9 NONINFECTIVE GASTROENTERITIS AND COLITIS, UNSPECIFIED: ICD-10-CM

## 2025-01-03 PROBLEM — R19.7 DIARRHEA: Status: ACTIVE | Noted: 2025-01-03

## 2025-01-03 PROCEDURE — 99204 OFFICE O/P NEW MOD 45 MIN: CPT

## 2025-01-03 RX ORDER — BLOOD SUGAR DIAGNOSTIC
STRIP MISCELLANEOUS
Qty: 100 | Refills: 2 | Status: ACTIVE | COMMUNITY
Start: 2025-01-03 | End: 1900-01-01

## 2025-01-03 RX ORDER — LANCETS 28 GAUGE
EACH MISCELLANEOUS
Qty: 100 | Refills: 2 | Status: ACTIVE | COMMUNITY
Start: 2025-01-03 | End: 1900-01-01

## 2025-01-07 DIAGNOSIS — R19.7 DIARRHEA, UNSPECIFIED: ICD-10-CM

## 2025-01-07 RX ORDER — POLYETHYLENE GLYCOL 3350 AND ELECTROLYTES WITH LEMON FLAVOR 236; 22.74; 6.74; 5.86; 2.97 G/4L; G/4L; G/4L; G/4L; G/4L
236 POWDER, FOR SOLUTION ORAL
Qty: 1 | Refills: 0 | Status: ACTIVE | COMMUNITY
Start: 2025-01-07 | End: 1900-01-01

## 2025-01-08 ENCOUNTER — APPOINTMENT (OUTPATIENT)
Dept: NUTRITION | Facility: CLINIC | Age: 45
End: 2025-01-08
Payer: MEDICAID

## 2025-01-08 ENCOUNTER — APPOINTMENT (OUTPATIENT)
Dept: PODIATRY | Facility: CLINIC | Age: 45
End: 2025-01-08
Payer: MEDICAID

## 2025-01-08 ENCOUNTER — OUTPATIENT (OUTPATIENT)
Dept: OUTPATIENT SERVICES | Facility: HOSPITAL | Age: 45
LOS: 1 days | End: 2025-01-08
Payer: MEDICAID

## 2025-01-08 DIAGNOSIS — B35.1 TINEA UNGUIUM: ICD-10-CM

## 2025-01-08 DIAGNOSIS — B35.3 TINEA PEDIS: ICD-10-CM

## 2025-01-08 DIAGNOSIS — E11.9 TYPE 2 DIABETES MELLITUS W/OUT COMPLICATIONS: ICD-10-CM

## 2025-01-08 DIAGNOSIS — Z00.00 ENCOUNTER FOR GENERAL ADULT MEDICAL EXAMINATION WITHOUT ABNORMAL FINDINGS: ICD-10-CM

## 2025-01-08 LAB
CDIFF BY PCR: NOT DETECTED
GI PCR PANEL: NOT DETECTED
HCV AB SER QL: NONREACTIVE
HCV S/CO RATIO: 0.05 COI

## 2025-01-08 PROCEDURE — 99204 OFFICE O/P NEW MOD 45 MIN: CPT | Mod: GC

## 2025-01-08 PROCEDURE — 99204 OFFICE O/P NEW MOD 45 MIN: CPT

## 2025-01-08 RX ORDER — CICLOPIROX OLAMINE 7.7 MG/G
0.77 CREAM TOPICAL TWICE DAILY
Qty: 1 | Refills: 1 | Status: ACTIVE | COMMUNITY
Start: 2025-01-08 | End: 1900-01-01

## 2025-01-08 RX ORDER — CICLOPIROX 71.3 MG/ML
8 SOLUTION TOPICAL
Qty: 1 | Refills: 6 | Status: ACTIVE | COMMUNITY
Start: 2025-01-08 | End: 1900-01-01

## 2025-01-08 NOTE — CHART NOTE - NSCHARTNOTEFT_GEN_A_CORE
DC summ reviewed, Liver lesion on ct scan was not on DC summ. Updated DC summ and called pt. Pt understood and agreed to follow up with GI and MRI outpt. Called primary care physician at [facility] and updated [facility] clinic primary care physician Dr. Vogt to let primary care physician who is seeing the pt know.
SPECIALTY: ophthalmology  Location: [facility]  date: 3/31/2025 @ [facility]
sent to Steffi Rincon 12/27 - DK / as per Mckenzie, pt declined services 1/2 - DK (Endocrine Referral)

## 2025-01-08 NOTE — CHART NOTE - NSCHARTNOTESELECT_GEN_ALL_CORE
-- DO NOT REPLY / DO NOT REPLY ALL --  -- Message is from [employer] [facility] [employer] ([facility]) --    General Patient Message     Patient's mother Joi is calling back to follow up on her message from yesterday. Please contact mother as soon as possible.      Caller Information       Type Contact Phone/Fax    12/07/2022 01:57 PM CST Phone (Incoming) JOI ORDOÑEZ (Mother) [phone]    12/08/2022 10:31 AM CST Phone (Incoming) JOI ORDOÑEZ (Mother) [phone]        Alternative phone number: [phone]    Can a detailed message be left? Yes    Message Turnaround: WINORTH:    Refer to site's KB page for routing instructions    Please give this turnaround time to the caller:   \"You can expect to receive a response 2-3 business days after your provider's clinical team reviews the message\"              
Advised pt of below   rx sent to pharmacy    Encounter Closed     
David Spencer.  If not better, she will need a chest x-ray.  Any concerns about aspiration?  
NON-CLINICAL APPT INFO/Event Note
Non-Clinical Appointment Info/Event Note
Patient's mother stopped by  when patient was in the clinic getting lab appointment.  Per patient's mother patient has had a cough for a cough of ac ouple of weeks. Per patient's mother patient has been taking Predisone for about a week.  The Predisone has helped some, but patient is still coughing.  Patient's mother would like patient seen for an appointment with Irvin.    Please call mother Dianna for an appointment.  
Spoke with pt's mother  States the cough is not getting any better  States the prednisone has slowed it some, but still has been coughing almost non-stop  Currently still on prednisone 40mg - she took the last dose of the full dose today and will start to wean tomorrow    Has been giving robitussin for the cough, but states it still doesn't want to stop    States she is not having any fevers  She is acting normal  Denies any SOB    States \"something is telling me that this little girl has a case of bronchitis\"  States cough is non-productive  When she sleeps she does not cough, but once they start to move her around it starts back up    She is wondering what the next steps are    Please advise      
Liver lesion/Event Note

## 2025-01-09 DIAGNOSIS — E11.9 TYPE 2 DIABETES MELLITUS WITHOUT COMPLICATIONS: ICD-10-CM

## 2025-01-09 LAB
DEPRECATED O AND P PREP STL: NORMAL
HAV IGM SER QL: NONREACTIVE
HBV CORE IGG+IGM SER QL: NONREACTIVE
HBV CORE IGM SER QL: NONREACTIVE
HBV SURFACE AB SER QL: NONREACTIVE
HBV SURFACE AG SER QL: NONREACTIVE
HEPATITIS A IGG ANTIBODY: REACTIVE
MITOCHONDRIA AB SER IF-ACNC: NORMAL
SMOOTH MUSCLE AB SER QL IF: NORMAL

## 2025-01-10 ENCOUNTER — APPOINTMENT (OUTPATIENT)
Dept: NUTRITION | Facility: CLINIC | Age: 45
End: 2025-01-10

## 2025-01-10 DIAGNOSIS — B35.1 TINEA UNGUIUM: ICD-10-CM
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
## 2025-01-10 LAB — BACTERIA STL CULT: NORMAL

## 2025-01-12 LAB — ANA SER IF-ACNC: NEGATIVE

## 2025-01-25 ENCOUNTER — OUTPATIENT (OUTPATIENT)
Dept: OUTPATIENT SERVICES | Facility: HOSPITAL | Age: 45
LOS: 1 days | End: 2025-01-25
Payer: MEDICAID

## 2025-01-25 DIAGNOSIS — K76.9 LIVER DISEASE, UNSPECIFIED: ICD-10-CM

## 2025-01-25 PROCEDURE — 82977 ASSAY OF GGT: CPT

## 2025-01-25 PROCEDURE — 80076 HEPATIC FUNCTION PANEL: CPT

## 2025-01-26 DIAGNOSIS — K76.9 LIVER DISEASE, UNSPECIFIED: ICD-10-CM

## 2025-02-04 ENCOUNTER — OUTPATIENT (OUTPATIENT)
Dept: OUTPATIENT SERVICES | Facility: HOSPITAL | Age: 45
LOS: 1 days | End: 2025-02-04
Payer: MEDICAID

## 2025-02-04 ENCOUNTER — APPOINTMENT (OUTPATIENT)
Dept: INTERNAL MEDICINE | Facility: CLINIC | Age: 45
End: 2025-02-04
Payer: MEDICAID

## 2025-02-04 VITALS
BODY MASS INDEX: 27.32 KG/M2 | HEIGHT: 66 IN | TEMPERATURE: 97.9 F | DIASTOLIC BLOOD PRESSURE: 69 MMHG | SYSTOLIC BLOOD PRESSURE: 102 MMHG | HEART RATE: 77 BPM | WEIGHT: 170 LBS | OXYGEN SATURATION: 99 %

## 2025-02-04 DIAGNOSIS — Z00.00 ENCOUNTER FOR GENERAL ADULT MEDICAL EXAMINATION WITHOUT ABNORMAL FINDINGS: ICD-10-CM

## 2025-02-04 DIAGNOSIS — E11.9 TYPE 2 DIABETES MELLITUS W/OUT COMPLICATIONS: ICD-10-CM

## 2025-02-04 PROCEDURE — 99213 OFFICE O/P EST LOW 20 MIN: CPT

## 2025-02-04 PROCEDURE — G2211 COMPLEX E/M VISIT ADD ON: CPT | Mod: NC

## 2025-02-05 ENCOUNTER — APPOINTMENT (OUTPATIENT)
Dept: ENDOCRINOLOGY | Facility: CLINIC | Age: 45
End: 2025-02-05

## 2025-02-05 ENCOUNTER — OUTPATIENT (OUTPATIENT)
Dept: OUTPATIENT SERVICES | Facility: HOSPITAL | Age: 45
LOS: 1 days | End: 2025-02-05

## 2025-02-05 VITALS
SYSTOLIC BLOOD PRESSURE: 110 MMHG | HEART RATE: 81 BPM | DIASTOLIC BLOOD PRESSURE: 70 MMHG | HEIGHT: 66 IN | WEIGHT: 171 LBS | BODY MASS INDEX: 27.48 KG/M2

## 2025-02-05 DIAGNOSIS — E11.65 TYPE 2 DIABETES MELLITUS WITH HYPERGLYCEMIA: ICD-10-CM

## 2025-02-05 DIAGNOSIS — E66.3 OVERWEIGHT: ICD-10-CM

## 2025-02-05 DIAGNOSIS — E11.9 TYPE 2 DIABETES MELLITUS WITHOUT COMPLICATIONS: ICD-10-CM

## 2025-02-05 DIAGNOSIS — Z00.00 ENCOUNTER FOR GENERAL ADULT MEDICAL EXAMINATION WITHOUT ABNORMAL FINDINGS: ICD-10-CM

## 2025-02-05 PROCEDURE — 99204 OFFICE O/P NEW MOD 45 MIN: CPT

## 2025-02-18 ENCOUNTER — OUTPATIENT (OUTPATIENT)
Dept: OUTPATIENT SERVICES | Facility: HOSPITAL | Age: 45
LOS: 1 days | Discharge: HOME | End: 2025-02-18
Payer: MEDICAID

## 2025-02-18 ENCOUNTER — RESULT REVIEW (OUTPATIENT)
Age: 45
End: 2025-02-18

## 2025-02-18 ENCOUNTER — TRANSCRIPTION ENCOUNTER (OUTPATIENT)
Age: 45
End: 2025-02-18

## 2025-02-18 VITALS
OXYGEN SATURATION: 100 % | SYSTOLIC BLOOD PRESSURE: 107 MMHG | RESPIRATION RATE: 17 BRPM | TEMPERATURE: 97 F | DIASTOLIC BLOOD PRESSURE: 67 MMHG | HEIGHT: 66 IN | WEIGHT: 164.91 LBS | HEART RATE: 60 BPM

## 2025-02-18 VITALS
DIASTOLIC BLOOD PRESSURE: 75 MMHG | SYSTOLIC BLOOD PRESSURE: 121 MMHG | HEART RATE: 65 BPM | RESPIRATION RATE: 16 BRPM | OXYGEN SATURATION: 99 %

## 2025-02-18 DIAGNOSIS — K64.8 OTHER HEMORRHOIDS: ICD-10-CM

## 2025-02-18 DIAGNOSIS — K52.9 NONINFECTIVE GASTROENTERITIS AND COLITIS, UNSPECIFIED: ICD-10-CM

## 2025-02-18 DIAGNOSIS — Z98.890 OTHER SPECIFIED POSTPROCEDURAL STATES: Chronic | ICD-10-CM

## 2025-02-18 DIAGNOSIS — R19.7 DIARRHEA, UNSPECIFIED: ICD-10-CM

## 2025-02-18 DIAGNOSIS — K57.30 DIVERTICULOSIS OF LARGE INTESTINE WITHOUT PERFORATION OR ABSCESS WITHOUT BLEEDING: ICD-10-CM

## 2025-02-18 PROBLEM — Z78.9 OTHER SPECIFIED HEALTH STATUS: Chronic | Status: INACTIVE | Noted: 2023-07-06 | Resolved: 2025-02-18

## 2025-02-18 LAB — GLUCOSE BLDC GLUCOMTR-MCNC: 89 MG/DL — SIGNIFICANT CHANGE UP (ref 70–99)

## 2025-02-18 PROCEDURE — 82962 GLUCOSE BLOOD TEST: CPT

## 2025-02-18 PROCEDURE — 88305 TISSUE EXAM BY PATHOLOGIST: CPT

## 2025-02-18 PROCEDURE — 88305 TISSUE EXAM BY PATHOLOGIST: CPT | Mod: 26

## 2025-02-18 PROCEDURE — 45378 DIAGNOSTIC COLONOSCOPY: CPT

## 2025-02-18 RX ORDER — SODIUM CHLORIDE 9 G/ML
1000 INJECTION, SOLUTION INTRAVENOUS
Refills: 0 | Status: DISCONTINUED | OUTPATIENT
Start: 2025-02-18 | End: 2025-02-18

## 2025-02-18 RX ADMIN — SODIUM CHLORIDE 75 MILLILITER(S): 9 INJECTION, SOLUTION INTRAVENOUS at 13:26

## 2025-02-18 NOTE — ASU DISCHARGE PLAN (ADULT/PEDIATRIC) - FINANCIAL ASSISTANCE
[facility] provides services at a reduced cost to those who are determined to be eligible through [facility]’s financial assistance program. Information regarding [facility]’s financial assistance program can be found by going to [email or web address] or by calling [phone].

## 2025-02-18 NOTE — ASU PATIENT PROFILE, ADULT - FALL HARM RISK - HARM RISK INTERVENTIONS

## 2025-02-18 NOTE — H&P PST ADULT - HISTORY OF PRESENT ILLNESS
45yo male h/o DM presents for evaluation following recent hospitalization with abdominal pain and colitis  ?  ?  #Abdominal pain  #Diarrhea (acute-persistent)  CT imaging showing acute colitis extending from the hepatic flexure through the proximal left colon which could be of infectious or inflammatory etiology. No bowel obstruction. Mild adjacent reactive mesenteric lymph nodes.  Possibly self limiting, need to exclude other etiologies  -Complete stool tests including c difficile, ova/parasites culture  -If negative would recommend schedule colonoscopy in 4-6 weeks to further evaluate, pt also due for screening purposes this year, discussed risks/benefits with pt/pts wife, they are agreeable to proceed  -Complete course of cipro/flagyl  -Low dairy/low residue diet for now as tolerated  ?

## 2025-02-18 NOTE — H&P PST ADULT - ASSESSMENT
43yo male h/o DM presents for evaluation following recent hospitalization with abdominal pain and colitis  ?  ?  #Abdominal pain  #Diarrhea (acute-persistent)  CT imaging showing acute colitis extending from the hepatic flexure through the proximal left colon which could be of infectious or inflammatory etiology. No bowel obstruction. Mild adjacent reactive mesenteric lymph nodes.  Possibly self limiting, need to exclude other etiologies  -Complete stool tests including c difficile, ova/parasites culture  -If negative would recommend schedule colonoscopy in 4-6 weeks to further evaluate, pt also due for screening purposes this year, discussed risks/benefits with pt/pts wife, they are agreeable to proceed  -Complete course of cipro/flagyl  -Low dairy/low residue diet for now as tolerated  ?

## 2025-02-20 LAB — SURGICAL PATHOLOGY STUDY: SIGNIFICANT CHANGE UP

## 2025-03-03 ENCOUNTER — APPOINTMENT (OUTPATIENT)
Dept: GASTROENTEROLOGY | Facility: CLINIC | Age: 45
End: 2025-03-03
Payer: MEDICAID

## 2025-03-03 VITALS
BODY MASS INDEX: 27.32 KG/M2 | WEIGHT: 170 LBS | HEIGHT: 66 IN | DIASTOLIC BLOOD PRESSURE: 75 MMHG | SYSTOLIC BLOOD PRESSURE: 113 MMHG | HEART RATE: 62 BPM

## 2025-03-03 DIAGNOSIS — R79.89 OTHER SPECIFIED ABNORMAL FINDINGS OF BLOOD CHEMISTRY: ICD-10-CM

## 2025-03-03 DIAGNOSIS — Z87.19 PERSONAL HISTORY OF OTHER DISEASES OF THE DIGESTIVE SYSTEM: ICD-10-CM

## 2025-03-03 DIAGNOSIS — K57.90 DIVERTICULOSIS OF INTESTINE, PART UNSPECIFIED, W/OUT PERFORATION OR ABSCESS W/OUT BLEEDING: ICD-10-CM

## 2025-03-03 PROBLEM — E11.9 TYPE 2 DIABETES MELLITUS WITHOUT COMPLICATIONS: Chronic | Status: ACTIVE | Noted: 2025-02-18

## 2025-03-03 PROCEDURE — 99214 OFFICE O/P EST MOD 30 MIN: CPT

## 2025-03-29 ENCOUNTER — OUTPATIENT (OUTPATIENT)
Dept: OUTPATIENT SERVICES | Facility: HOSPITAL | Age: 45
LOS: 1 days | End: 2025-03-29
Payer: MEDICAID

## 2025-03-29 ENCOUNTER — RESULT REVIEW (OUTPATIENT)
Age: 45
End: 2025-03-29

## 2025-03-29 DIAGNOSIS — Z00.8 ENCOUNTER FOR OTHER GENERAL EXAMINATION: ICD-10-CM

## 2025-03-29 DIAGNOSIS — Z98.890 OTHER SPECIFIED POSTPROCEDURAL STATES: Chronic | ICD-10-CM

## 2025-03-29 DIAGNOSIS — R79.89 OTHER SPECIFIED ABNORMAL FINDINGS OF BLOOD CHEMISTRY: ICD-10-CM

## 2025-03-29 PROCEDURE — 76705 ECHO EXAM OF ABDOMEN: CPT

## 2025-03-29 PROCEDURE — 76705 ECHO EXAM OF ABDOMEN: CPT | Mod: 26

## 2025-03-30 DIAGNOSIS — R79.89 OTHER SPECIFIED ABNORMAL FINDINGS OF BLOOD CHEMISTRY: ICD-10-CM

## 2025-03-31 ENCOUNTER — OUTPATIENT (OUTPATIENT)
Dept: OUTPATIENT SERVICES | Facility: HOSPITAL | Age: 45
LOS: 1 days | End: 2025-03-31
Payer: MEDICAID

## 2025-03-31 ENCOUNTER — APPOINTMENT (OUTPATIENT)
Dept: OPHTHALMOLOGY | Facility: CLINIC | Age: 45
End: 2025-03-31
Payer: MEDICAID

## 2025-03-31 DIAGNOSIS — Z98.890 OTHER SPECIFIED POSTPROCEDURAL STATES: Chronic | ICD-10-CM

## 2025-03-31 DIAGNOSIS — H53.8 OTHER VISUAL DISTURBANCES: ICD-10-CM

## 2025-03-31 PROCEDURE — 92004 COMPRE OPH EXAM NEW PT 1/>: CPT

## 2025-04-03 DIAGNOSIS — H04.123 DRY EYE SYNDROME OF BILATERAL LACRIMAL GLANDS: ICD-10-CM

## 2025-04-03 DIAGNOSIS — I10 ESSENTIAL (PRIMARY) HYPERTENSION: ICD-10-CM

## 2025-04-03 DIAGNOSIS — E11.9 TYPE 2 DIABETES MELLITUS WITHOUT COMPLICATIONS: ICD-10-CM

## 2025-04-26 ENCOUNTER — OUTPATIENT (OUTPATIENT)
Dept: OUTPATIENT SERVICES | Facility: HOSPITAL | Age: 45
LOS: 1 days | End: 2025-04-26
Payer: MEDICAID

## 2025-04-26 DIAGNOSIS — Z98.890 OTHER SPECIFIED POSTPROCEDURAL STATES: Chronic | ICD-10-CM

## 2025-04-26 DIAGNOSIS — E11.9 TYPE 2 DIABETES MELLITUS WITHOUT COMPLICATIONS: ICD-10-CM

## 2025-04-26 PROCEDURE — 80053 COMPREHEN METABOLIC PANEL: CPT

## 2025-04-26 PROCEDURE — 84443 ASSAY THYROID STIM HORMONE: CPT

## 2025-04-26 PROCEDURE — 85027 COMPLETE CBC AUTOMATED: CPT

## 2025-04-26 PROCEDURE — 83036 HEMOGLOBIN GLYCOSYLATED A1C: CPT

## 2025-04-26 PROCEDURE — 80061 LIPID PANEL: CPT

## 2025-04-27 DIAGNOSIS — E11.9 TYPE 2 DIABETES MELLITUS WITHOUT COMPLICATIONS: ICD-10-CM

## 2025-06-02 ENCOUNTER — APPOINTMENT (OUTPATIENT)
Dept: GASTROENTEROLOGY | Facility: CLINIC | Age: 45
End: 2025-06-02
Payer: MEDICAID

## 2025-06-02 VITALS
HEART RATE: 68 BPM | HEIGHT: 66 IN | SYSTOLIC BLOOD PRESSURE: 121 MMHG | BODY MASS INDEX: 28.12 KG/M2 | WEIGHT: 175 LBS | DIASTOLIC BLOOD PRESSURE: 78 MMHG

## 2025-06-02 DIAGNOSIS — K76.9 LIVER DISEASE, UNSPECIFIED: ICD-10-CM

## 2025-06-02 DIAGNOSIS — R79.89 OTHER SPECIFIED ABNORMAL FINDINGS OF BLOOD CHEMISTRY: ICD-10-CM

## 2025-06-02 PROCEDURE — 99214 OFFICE O/P EST MOD 30 MIN: CPT

## 2025-06-06 ENCOUNTER — APPOINTMENT (OUTPATIENT)
Dept: INTERNAL MEDICINE | Facility: CLINIC | Age: 45
End: 2025-06-06
Payer: MEDICAID

## 2025-06-06 ENCOUNTER — OUTPATIENT (OUTPATIENT)
Dept: OUTPATIENT SERVICES | Facility: HOSPITAL | Age: 45
LOS: 1 days | End: 2025-06-06
Payer: MEDICAID

## 2025-06-06 VITALS
HEART RATE: 75 BPM | OXYGEN SATURATION: 100 % | BODY MASS INDEX: 27.97 KG/M2 | DIASTOLIC BLOOD PRESSURE: 75 MMHG | WEIGHT: 174 LBS | SYSTOLIC BLOOD PRESSURE: 125 MMHG | HEIGHT: 66 IN

## 2025-06-06 DIAGNOSIS — Z00.00 ENCOUNTER FOR GENERAL ADULT MEDICAL EXAMINATION WITHOUT ABNORMAL FINDINGS: ICD-10-CM

## 2025-06-06 DIAGNOSIS — Z98.890 OTHER SPECIFIED POSTPROCEDURAL STATES: Chronic | ICD-10-CM

## 2025-06-06 PROCEDURE — 99214 OFFICE O/P EST MOD 30 MIN: CPT

## 2025-06-06 RX ORDER — ATORVASTATIN CALCIUM 20 MG/1
20 TABLET, FILM COATED ORAL
Qty: 90 | Refills: 3 | Status: ACTIVE | COMMUNITY
Start: 2025-06-06 | End: 1900-01-01

## 2025-06-12 DIAGNOSIS — E11.9 TYPE 2 DIABETES MELLITUS WITHOUT COMPLICATIONS: ICD-10-CM

## 2025-06-12 DIAGNOSIS — E78.5 HYPERLIPIDEMIA, UNSPECIFIED: ICD-10-CM

## 2025-06-12 DIAGNOSIS — Z00.00 ENCOUNTER FOR GENERAL ADULT MEDICAL EXAMINATION WITHOUT ABNORMAL FINDINGS: ICD-10-CM

## 2025-06-12 DIAGNOSIS — E16.2 HYPOGLYCEMIA, UNSPECIFIED: ICD-10-CM

## 2025-07-09 ENCOUNTER — APPOINTMENT (OUTPATIENT)
Dept: PODIATRY | Facility: CLINIC | Age: 45
End: 2025-07-09
Payer: MEDICAID

## 2025-07-09 ENCOUNTER — OUTPATIENT (OUTPATIENT)
Dept: OUTPATIENT SERVICES | Facility: HOSPITAL | Age: 45
LOS: 1 days | End: 2025-07-09
Payer: MEDICAID

## 2025-07-09 DIAGNOSIS — Z98.890 OTHER SPECIFIED POSTPROCEDURAL STATES: Chronic | ICD-10-CM

## 2025-07-09 DIAGNOSIS — Z00.00 ENCOUNTER FOR GENERAL ADULT MEDICAL EXAMINATION WITHOUT ABNORMAL FINDINGS: ICD-10-CM

## 2025-07-09 PROCEDURE — 99213 OFFICE O/P EST LOW 20 MIN: CPT | Mod: GC

## 2025-07-09 RX ORDER — CICLOPIROX 71.3 MG/ML
8 SOLUTION TOPICAL
Qty: 1 | Refills: 4 | Status: ACTIVE | COMMUNITY
Start: 2025-07-09 | End: 1900-01-01
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
## 2025-07-23 DIAGNOSIS — B35.1 TINEA UNGUIUM: ICD-10-CM

## 2025-07-23 DIAGNOSIS — B35.3 TINEA PEDIS: ICD-10-CM

## 2025-07-23 DIAGNOSIS — E11.65 TYPE 2 DIABETES MELLITUS WITH HYPERGLYCEMIA: ICD-10-CM
